# Patient Record
Sex: FEMALE | Race: WHITE | NOT HISPANIC OR LATINO | Employment: OTHER | URBAN - METROPOLITAN AREA
[De-identification: names, ages, dates, MRNs, and addresses within clinical notes are randomized per-mention and may not be internally consistent; named-entity substitution may affect disease eponyms.]

---

## 2021-11-02 RX ORDER — CYCLOBENZAPRINE HCL 10 MG
1 TABLET ORAL EVERY 24 HOURS
COMMUNITY

## 2021-11-02 RX ORDER — LEVOTHYROXINE SODIUM 0.15 MG/1
1 TABLET ORAL EVERY 24 HOURS
COMMUNITY

## 2021-11-03 ENCOUNTER — OFFICE VISIT (OUTPATIENT)
Dept: OBGYN CLINIC | Facility: CLINIC | Age: 71
End: 2021-11-03
Payer: MEDICARE

## 2021-11-03 VITALS
SYSTOLIC BLOOD PRESSURE: 102 MMHG | WEIGHT: 178.4 LBS | BODY MASS INDEX: 33.68 KG/M2 | DIASTOLIC BLOOD PRESSURE: 60 MMHG | HEIGHT: 61 IN

## 2021-11-03 DIAGNOSIS — Z87.410 HISTORY OF CERVICAL DYSPLASIA: ICD-10-CM

## 2021-11-03 DIAGNOSIS — Z12.31 BREAST CANCER SCREENING BY MAMMOGRAM: ICD-10-CM

## 2021-11-03 DIAGNOSIS — R63.4 UNINTENTIONAL WEIGHT LOSS: Primary | ICD-10-CM

## 2021-11-03 DIAGNOSIS — R14.0 BLOATING SYMPTOM: ICD-10-CM

## 2021-11-03 DIAGNOSIS — Z12.4 SCREENING FOR CERVICAL CANCER: ICD-10-CM

## 2021-11-03 DIAGNOSIS — Z78.0 POSTMENOPAUSAL ESTROGEN DEFICIENCY: ICD-10-CM

## 2021-11-03 PROBLEM — N87.9 CERVICAL DYSPLASIA: Status: ACTIVE | Noted: 2021-11-03

## 2021-11-03 PROCEDURE — G0476 HPV COMBO ASSAY CA SCREEN: HCPCS | Performed by: STUDENT IN AN ORGANIZED HEALTH CARE EDUCATION/TRAINING PROGRAM

## 2021-11-03 PROCEDURE — 99213 OFFICE O/P EST LOW 20 MIN: CPT | Performed by: STUDENT IN AN ORGANIZED HEALTH CARE EDUCATION/TRAINING PROGRAM

## 2021-11-03 PROCEDURE — G0101 CA SCREEN;PELVIC/BREAST EXAM: HCPCS | Performed by: STUDENT IN AN ORGANIZED HEALTH CARE EDUCATION/TRAINING PROGRAM

## 2021-11-03 PROCEDURE — G0145 SCR C/V CYTO,THINLAYER,RESCR: HCPCS | Performed by: STUDENT IN AN ORGANIZED HEALTH CARE EDUCATION/TRAINING PROGRAM

## 2021-11-03 RX ORDER — CALCITRIOL 0.5 UG/1
1 CAPSULE, LIQUID FILLED ORAL DAILY
COMMUNITY
End: 2022-01-25 | Stop reason: SDUPTHER

## 2021-11-03 RX ORDER — SPIRONOLACTONE 50 MG/1
1 TABLET, FILM COATED ORAL DAILY
COMMUNITY

## 2021-11-03 RX ORDER — ROSUVASTATIN CALCIUM 5 MG/1
1 TABLET, COATED ORAL EVERY 24 HOURS
COMMUNITY
Start: 2021-06-30

## 2021-11-03 RX ORDER — MECLIZINE HYDROCHLORIDE 25 MG/1
1 TABLET ORAL AS NEEDED
COMMUNITY
Start: 2021-09-09

## 2021-11-03 RX ORDER — LORAZEPAM 1 MG/1
1 TABLET ORAL AS NEEDED
COMMUNITY

## 2021-11-03 RX ORDER — LOSARTAN POTASSIUM 25 MG/1
1 TABLET ORAL EVERY 24 HOURS
COMMUNITY

## 2021-11-03 RX ORDER — FAMOTIDINE 40 MG/1
1 TABLET, FILM COATED ORAL AS NEEDED
COMMUNITY
Start: 2021-09-23

## 2021-11-03 RX ORDER — ERGOCALCIFEROL 1.25 MG/1
1 CAPSULE ORAL WEEKLY
COMMUNITY
Start: 2014-04-18

## 2021-11-03 RX ORDER — FLUOXETINE HYDROCHLORIDE 40 MG/1
2 CAPSULE ORAL EVERY 24 HOURS
COMMUNITY

## 2021-11-03 RX ORDER — OXYCODONE HYDROCHLORIDE 10 MG/1
1 TABLET ORAL AS NEEDED
COMMUNITY

## 2021-11-03 RX ORDER — FUROSEMIDE 80 MG
1 TABLET ORAL EVERY 24 HOURS
COMMUNITY
End: 2022-02-16

## 2021-11-03 RX ORDER — LAMOTRIGINE 150 MG/1
1 TABLET ORAL 2 TIMES DAILY
COMMUNITY

## 2021-11-05 LAB
HPV HR 12 DNA CVX QL NAA+PROBE: NEGATIVE
HPV16 DNA CVX QL NAA+PROBE: NEGATIVE
HPV18 DNA CVX QL NAA+PROBE: NEGATIVE

## 2021-11-08 LAB
LAB AP GYN PRIMARY INTERPRETATION: NORMAL
LAB AP LMP: NORMAL
Lab: NORMAL

## 2021-12-17 ENCOUNTER — TELEPHONE (OUTPATIENT)
Dept: OBGYN CLINIC | Facility: CLINIC | Age: 71
End: 2021-12-17

## 2021-12-17 PROBLEM — M81.6 LOCALIZED OSTEOPOROSIS WITHOUT CURRENT PATHOLOGICAL FRACTURE: Status: ACTIVE | Noted: 2021-12-17

## 2022-01-07 ENCOUNTER — CONSULT (OUTPATIENT)
Dept: OBGYN CLINIC | Facility: CLINIC | Age: 72
End: 2022-01-07
Payer: MEDICARE

## 2022-01-07 VITALS
DIASTOLIC BLOOD PRESSURE: 62 MMHG | WEIGHT: 174 LBS | HEIGHT: 61 IN | BODY MASS INDEX: 32.85 KG/M2 | SYSTOLIC BLOOD PRESSURE: 112 MMHG

## 2022-01-07 DIAGNOSIS — R93.89 THICKENED ENDOMETRIUM: ICD-10-CM

## 2022-01-07 DIAGNOSIS — M81.6 LOCALIZED OSTEOPOROSIS WITHOUT CURRENT PATHOLOGICAL FRACTURE: Primary | ICD-10-CM

## 2022-01-07 PROCEDURE — 99213 OFFICE O/P EST LOW 20 MIN: CPT | Performed by: STUDENT IN AN ORGANIZED HEALTH CARE EDUCATION/TRAINING PROGRAM

## 2022-01-07 NOTE — ASSESSMENT & PLAN NOTE
- Incidental finding of likely endometrial polyp on TVUS at [facility] yesterday  Normal ovaries  Patient has had no postmenopausal bleeding with exception of light spotting after performance of TVUS yesterday  - Reviewed that if polyp present, resection is typically recommended due to risk of malignant transformation  Performance of sonohysterography would be prudent to confirm presence of polyp prior to proceeding with resection  Will schedule

## 2022-01-07 NOTE — ASSESSMENT & PLAN NOTE
- DEXA results reviewed  Discussed diagnosis of osteoporosis  Treatment is recommended to reduce risk of fracture  - Recommend first-line treatment with oral alendronate  No hx GERD  Risks/benefits reviewed  - Will obtain baseline creatinine, serum calcium, and vitamin D  Pt believes she had all of these done with PCP in October 2021  Will obtain lab results and bring them to office for review  In case these were not completed, orders provided today for BMP and vitamin D level  Will provide Rx Fosamax upon review of satisfactory results  - Plan for repeat DEXA 2 years after initiation of therapy

## 2022-01-07 NOTE — PROGRESS NOTES
[address] [facility], [address], [facility], [address] [facility]    Assessment/Plan:  1  Localized osteoporosis without current pathological fracture  Assessment & Plan:  - DEXA results reviewed  Discussed diagnosis of osteoporosis  Treatment is recommended to reduce risk of fracture  - Recommend first-line treatment with oral alendronate  No hx GERD  Risks/benefits reviewed  - Will obtain baseline creatinine, serum calcium, and vitamin D  Pt believes she had all of these done with PCP in October 2021  Will obtain lab results and bring them to office for review  In case these were not completed, orders provided today for BMP and vitamin D level  Will provide Rx Fosamax upon review of satisfactory results  - Plan for repeat DEXA 2 years after initiation of therapy  Orders:  -     Basic metabolic panel; Future  -     Vitamin D 1,25 dihydroxy; Future    2  Thickened endometrium  Assessment & Plan:  - Incidental finding of likely endometrial polyp on TVUS at [facility] yesterday  Normal ovaries  Patient has had no postmenopausal bleeding with exception of light spotting after performance of TVUS yesterday  - Reviewed that if polyp present, resection is typically recommended due to risk of malignant transformation  Performance of sonohysterography would be prudent to confirm presence of polyp prior to proceeding with resection  Will schedule  Orders:  -     US sonohysterogram w or wo doppler; Future; Expected date: 01/07/2022      Subjective:   Emily Setting is a 73 y o  [id number]   CC:   Chief Complaint   Patient presents with    Consult     Pt here to discuss dexa scan        HPI: Patient presents for review of imaging results  She is without complaint today  ROS: Negative except as noted in HPI    No LMP recorded  Patient is postmenopausal        She  reports being sexually active and has had partner(s) who are male   She reports using the following method of birth control/protection: Female Sterilization         The following portions of the patient's history were reviewed and updated as appropriate:   Past Medical History:   Diagnosis Date    Anxiety     Condyloma     Depression     Eczema     HPV in female     [facility] [address]     Hypertension     Melanoma ([facility] [address] [facility]     Migraines     Papanicolaou smear 04/13/2018    Thyroid cancer ([facility] [address] [facility]      Past Surgical History:   Procedure Laterality Date    ABDOMINOPLASTY  1985    BARIATRIC SURGERY  2003    CERVICAL CONE BIOPSY      [address] [place] [address]    COLONOSCOPY  2014    DILATION AND EVACUATION  1986    DXA PROCEDURE (HISTORICAL)  2017    HERNIA REPAIR      MAMMO (HISTORICAL)  2017    [facility]    SINUS SURGERY      THYROIDECTOMY, PARTIAL      TONSILLECTOMY      TUBAL LIGATION       Family History   Problem Relation Age of Onset    Diabetes Mother     Hypertension Mother     Hypertension Father     Diabetes Father     Diabetes Maternal Grandmother     Heart disease Maternal Grandfather     No Known Problems Paternal Grandmother     No Known Problems Paternal Grandfather     Asthma Son     Raynaud syndrome Son     Breast cancer Neg Hx     Ovarian cancer Neg Hx     Colon cancer Neg Hx     Uterine cancer Neg Hx      Social History     Socioeconomic History    Marital status: /Civil Union     Spouse name: None    Number of children: None    Years of education: None    Highest education level: None   Occupational History    Occupation: Retired    Tobacco Use    Smoking status: Never Smoker    Smokeless tobacco: Never Used   Vaping Use    Vaping Use: Never used   Substance and Sexual Activity    Alcohol use: Never     Comment: Does not drink alcohol - As per [employer]     Drug use: Never     Comment: No - As per [employer]     Sexual activity: Yes     Partners: Male     Birth control/protection: Female Sterilization     Comment: no new partner in 28 years   Other Topics Concern    None   Social History Narrative    Exercise: Occasionally    Domestic violence: No     Social Determinants of Health     Financial Resource Strain: Not on file   Food Insecurity: Not on file   Transportation Needs: Not on file   Physical Activity: Not on file   Stress: Not on file   Social Connections: Not on file   Intimate Partner Violence: Not on file   Housing Stability: Not on file     Outpatient Medications Marked as Taking for the 1/7/22 encounter (Consult) with Emma Verde MD   Medication    albuterol (PROVENTIL HFA,VENTOLIN HFA) 90 mcg/act inhaler    calcitriol (ROCALTROL) 0 5 MCG capsule    cyclobenzaprine (FLEXERIL) 10 mg tablet    ergocalciferol (VITAMIN D2) 50,000 units    famotidine (PEPCID) 40 MG tablet    FLUoxetine (PROzac) 40 MG capsule    furosemide (LASIX) 80 mg tablet    lamoTRIgine (LaMICtal) 150 MG tablet    levothyroxine 150 mcg tablet    LORazepam (ATIVAN) 1 mg tablet    losartan (COZAAR) 25 mg tablet    meclizine (ANTIVERT) 25 mg tablet    metoprolol tartrate (LOPRESSOR) 25 mg tablet    Multiple Vitamins-Minerals (CENTRUM SILVER 50+WOMEN PO)    oxyCODONE (ROXICODONE) 10 MG TABS    rosuvastatin (CRESTOR) 5 mg tablet    spironolactone (ALDACTONE) 50 mg tablet     Allergies   Allergen Reactions    Banana Extract Allergy Skin Test - Food Allergy Shortness Of Breath     Chest tightness    Charentais Melon (Saudi Arabian Melon) Shortness Of Breath     Cantelope  Chest tightness    Kiwi Extract - Food Allergy Shortness Of Breath     Chest tightness    Aspirin Other (See Comments)    Ibuprofen Hives, Itching and Swelling    Iodinated Diagnostic Agents Hives    Naproxen Hives    Nsaids Other (See Comments)     swollen lips, oral itching    Sulfamethoxazole-Trimethoprim Blisters and Other (See Comments)     oral ulcers      Ciprofloxacin Rash    Latex Rash and Swelling         Imaging: _____________________________________________            Objective:  /62 (BP Location: Left arm, Patient Position: Sitting, Cuff Size: Standard)   Ht 5' 1" (1 549 m)   Wt 78 9 kg (174 lb)   BMI 32 88 kg/m²      General Appearance: alert and oriented, in no acute distress  Extremities: Normal range of motion     Skin: normal, no rash or abnormalities  Neurologic: alert, oriented x3  Psychiatric: Appropriate affect, mood stable, cooperative with exam         Amauri Torres MD  1/7/2022 2:07 PM

## 2022-01-12 ENCOUNTER — TELEPHONE (OUTPATIENT)
Dept: DERMATOLOGY | Facility: CLINIC | Age: 72
End: 2022-01-12

## 2022-01-21 ENCOUNTER — TELEPHONE (OUTPATIENT)
Dept: OBGYN CLINIC | Facility: CLINIC | Age: 72
End: 2022-01-21

## 2022-01-21 NOTE — TELEPHONE ENCOUNTER
Please have patient do one of the followin  Stop by the office to complete release of records form to obtain recent Vitamin D and Calcium levels  She can do this at the time of her upcoming sonohyst on  or sooner if she would like  2  Have new blood work drawn  Orders were provided at the time of her most recent visit  Rx for fosamax will be provided once labs have been reviewed       Mike Foote MD  2022 12:06 PM

## 2022-01-21 NOTE — TELEPHONE ENCOUNTER
Spoke with pt and provided following options  Dr Pollo Evans will prescribe Fosamax once labs have been reviewed  Pt informed her PCP has a copy of her blood work, she will reach out to PCP and inquire if they can fax a copy for Dr Pollo Evans  Fax and MR number provided to help facilitate copy of blood work

## 2022-01-21 NOTE — TELEPHONE ENCOUNTER
Carolina Albarran left a message that she had blood tests completed and asked [facility] to forward a copy of the results to you  The patient states she was told by [facility] that because you were not the ordering doctor they will not send you results unless you request them

## 2022-01-24 ENCOUNTER — TELEPHONE (OUTPATIENT)
Dept: OBGYN CLINIC | Facility: CLINIC | Age: 72
End: 2022-01-24

## 2022-01-24 DIAGNOSIS — M81.6 LOCALIZED OSTEOPOROSIS WITHOUT CURRENT PATHOLOGICAL FRACTURE: Primary | ICD-10-CM

## 2022-01-24 NOTE — TELEPHONE ENCOUNTER
Called patient to discuss lab results sent from Dr Maria Luisa Shanks office (see Media tab)  Calcium was low normal (pt not taking calcium supplementation at time of labs) and renal function was normal  No results received for vitamin D level  Given her history of hyperparathyroidism, important to confirm normal vitamin D level prior to initiation of bisphosphonate therapy  Patient will call Dr Maria Luisa Shanks office again, as she believes vitamin D level was drawn  Will await results  If normal vitamin D level, will prescribe calcitriol (pt states she needs rx) and Fosamax, as previously discussed       Diana Weems MD  1/24/2022 12:00 PM

## 2022-01-25 RX ORDER — CALCITRIOL 0.5 UG/1
0.5 CAPSULE, LIQUID FILLED ORAL DAILY
Qty: 30 CAPSULE | Refills: 11 | Status: SHIPPED | OUTPATIENT
Start: 2022-01-25 | End: 2023-01-25

## 2022-01-25 RX ORDER — ALENDRONATE SODIUM 70 MG/1
70 TABLET ORAL
Qty: 4 TABLET | Refills: 12 | Status: SHIPPED | OUTPATIENT
Start: 2022-01-25 | End: 2022-02-16

## 2022-01-25 NOTE — TELEPHONE ENCOUNTER
All necessary lab results now received  Creatinine, vitamin D level, and calcium level appropriate  Will initiate calcitriol and Fosamax, as previously discussed  Rx sent  Patient informed by phone      Keeley Zee MD  1/25/2022 1:01 PM

## 2022-02-01 ENCOUNTER — OFFICE VISIT (OUTPATIENT)
Dept: GASTROENTEROLOGY | Facility: CLINIC | Age: 72
End: 2022-02-01
Payer: MEDICARE

## 2022-02-01 VITALS
WEIGHT: 174 LBS | DIASTOLIC BLOOD PRESSURE: 72 MMHG | SYSTOLIC BLOOD PRESSURE: 114 MMHG | BODY MASS INDEX: 32.85 KG/M2 | HEART RATE: 68 BPM | HEIGHT: 61 IN

## 2022-02-01 DIAGNOSIS — Z98.890 HISTORY OF COLONOSCOPY: ICD-10-CM

## 2022-02-01 DIAGNOSIS — K21.9 GASTROESOPHAGEAL REFLUX DISEASE, UNSPECIFIED WHETHER ESOPHAGITIS PRESENT: ICD-10-CM

## 2022-02-01 DIAGNOSIS — R63.4 UNINTENTIONAL WEIGHT LOSS: Primary | ICD-10-CM

## 2022-02-01 DIAGNOSIS — D64.9 ANEMIA, UNSPECIFIED TYPE: ICD-10-CM

## 2022-02-01 PROCEDURE — 99213 OFFICE O/P EST LOW 20 MIN: CPT | Performed by: NURSE PRACTITIONER

## 2022-02-01 NOTE — PATIENT INSTRUCTIONS
Scheduled date of colonoscopy (as of today): not scheduled  Physician performing colonoscopy:  Location of colonoscopy: [facility]  Bowel prep reviewed with patient: Clenpiq  Instructions reviewed with patient by: Tori Live  Clearances: none

## 2022-02-02 NOTE — PROGRESS NOTES
[address] [facility] - Outpatient Follow-up Note  Emmie Fair 73 y o  female MRN: [id number]  Encounter: [id number]    ASSESSMENT AND PLAN:      1  Unintentional weight loss  Patient states she has lost approximately 64 lb over the last 6 months  She denies difficulty eating or swallowing and states she eats a normal diet  She does have a history of gastric bypass Jose-en-Y 2003 states she has not had any complications over the years  Most recent lab work was normal   She denies any overt bleeding  Pending EGD and colon will consider nutrition consult, possibly with bariatric staff  Consider malabsorption, malignancy  - schedule EGD and colonoscopy at [address] [place] [address] per patients request   Due to patient having bradycardic and tachycardic heart rates ( bpm) noted to loop recorder will schedule at hospital setting  2  Gastroesophageal reflux disease  Patient states she rarely has acid reflux symptoms  If alarm symptoms do occur, she states that she takes Pepcid 40 mg as needed to resolve symptoms  3  Anemia, unspecified type  Patient states that she has had a history of anemia however most recent lab work was normal   She denies any overt bleeding  Will continue to monitor lab work  She will be having an EGD/colonoscopy subsequently for unintentional weight loss  4 History of colonoscopy  Colonoscopy 2015; 10 year recall      Followup Appointment:  3-4 months, after EGD/colonoscopy  ______________________________________________________________________    Chief Complaint   Patient presents with   [facility]  loss     Referred by Dr Nataliia Esparza     HPI:  61-year-old female with history of GERD, anemia, IBS, cholecystectomy, gastric bypass Jose-en-Y (2003) presents for consult by PCP with weight loss, GERD, anemia  Patient states that she has lost approximately 64 lb in 6 months  Patient states she is eating a normal diet without complications    She denies nausea or vomiting  She states she does have a deep left-sided discomfort she believes to be possible hernia as discussed with her PCP  She denies any acid reflux symptoms and she has taken Pepcid 40 mg as needed for alarm symptoms  She states she is having daily small formed bowel movements  She denies hematochezia or melena  Most recent lab work CBC, CMP normal with exception of alk-phos of 206  Iron panel, B12, folate, TSH normal   Patient's last colonoscopy 2015 noted mild diverticulosis, hemorrhoids, 10 year recall  EGD 2015 noted small hiatal hernia otherwise normal   Patient does state that she has a loop recorder that has identified Lexy Gonzalez and tachy heart rates  Nonsmoker, denies ETOH use        Historical Information   Past Medical History:   Diagnosis Date    Anxiety     Condyloma     Depression     Eczema     HPV in female     [facility] [address]     Hypertension     Melanoma ([facility] [address]     Migraines     Papanicolaou smear 2018    Thyroid cancer ([facility] [address]      Past Surgical History:   Procedure Laterality Date    ABDOMINOPLASTY      BARIATRIC SURGERY      CERVICAL CONE BIOPSY       SECTION   &     COLONOSCOPY      DILATION AND EVACUATION      DXA PROCEDURE (HISTORICAL)      HERNIA REPAIR      MAMMO (HISTORICAL)      [facility]    SINUS SURGERY      THYROIDECTOMY, PARTIAL      TONSILLECTOMY      TUBAL LIGATION       Social History     Substance and Sexual Activity   Alcohol Use Never    Comment: Does not drink alcohol - As per [employer]      Social History     Substance and Sexual Activity   Drug Use Never    Comment: No - As per [employer]      Social History     Tobacco Use   Smoking Status Never Smoker   Smokeless Tobacco Never Used     Family History   Problem Relation Age of Onset    Diabetes Mother     Hypertension Mother     Hypertension Father     Diabetes Father     Diabetes Maternal Grandmother     Heart disease Maternal Grandfather     No Known Problems Paternal Grandmother     No Known Problems Paternal Grandfather     Asthma Son     Raynaud syndrome Son     Breast cancer Neg Hx     Ovarian cancer Neg Hx     Colon cancer Neg Hx     Uterine cancer Neg Hx          Current Outpatient Medications:     albuterol (PROVENTIL HFA,VENTOLIN HFA) 90 mcg/act inhaler    alendronate (FOSAMAX) 70 mg tablet    calcitriol (ROCALTROL) 0 5 MCG capsule    cyclobenzaprine (FLEXERIL) 10 mg tablet    ergocalciferol (VITAMIN D2) 50,000 units    famotidine (PEPCID) 40 MG tablet    FLUoxetine (PROzac) 40 MG capsule    furosemide (LASIX) 80 mg tablet    lamoTRIgine (LaMICtal) 150 MG tablet    levothyroxine 150 mcg tablet    LORazepam (ATIVAN) 1 mg tablet    losartan (COZAAR) 25 mg tablet    meclizine (ANTIVERT) 25 mg tablet    metoprolol tartrate (LOPRESSOR) 25 mg tablet    Multiple Vitamins-Minerals (CENTRUM SILVER 50+WOMEN PO)    oxyCODONE (ROXICODONE) 10 MG TABS    rosuvastatin (CRESTOR) 5 mg tablet    spironolactone (ALDACTONE) 50 mg tablet  Allergies   Allergen Reactions    Banana Extract Allergy Skin Test - Food Allergy Shortness Of Breath     Chest tightness    Charentais Melon (Belarusian Melon) Shortness Of Breath     Cantelope  Chest tightness    Kiwi Extract - Food Allergy Shortness Of Breath     Chest tightness    Aspirin Other (See Comments)    Ibuprofen Hives, Itching and Swelling    Iodinated Diagnostic Agents Hives    Naproxen Hives    Nsaids Other (See Comments)     swollen lips, oral itching    Sulfamethoxazole-Trimethoprim Blisters and Other (See Comments)     oral ulcers      Ciprofloxacin Rash    Latex Rash and Swelling     Reviewed medications and allergies and updated as indicated    PHYSICAL EXAM:    Blood pressure 114/72, pulse 68, height 5' 1" (1 549 m), weight 78 9 kg (174 lb), not currently breastfeeding  Body mass index is 32 88 kg/m²    General Appearance: NAD, cooperative, alert  Eyes: Anicteric, PERRLA, EOMI  ENT:  Normocephalic, atraumatic, normal mucosa  Neck:  Supple, symmetrical, trachea midline  Resp:  Clear to auscultation bilaterally; no rales, rhonchi or wheezing; respirations unlabored   CV:  S1 S2, Regular rate and rhythm; no murmur, rub, or gallop  GI:  Soft, upper left abdomen discomfort with palpation, non-distended; normal bowel sounds; no masses, no organomegaly   Rectal: Deferred  Musculoskeletal: No cyanosis, clubbing or edema  Normal ROM  Skin:  No jaundice, rashes, or lesions   Heme/Lymph: No palpable cervical lymphadenopathy  Psych: Normal affect, good eye contact  Neuro: No gross deficits, AAOx3    Lab Results:   No results found for: WBC, HGB, HCT, MCV, PLT  No results found for: NA, K, CL, CO2, ANIONGAP, BUN, CREATININE, GLUCOSE, GLUF, CALCIUM, CORRECTEDCA, AST, ALT, ALKPHOS, PROT, BILITOT, EGFR  No results found for: IRON, TIBC, FERRITIN  No results found for: LIPASE    Radiology Results:   No results found

## 2022-02-09 ENCOUNTER — ULTRASOUND (OUTPATIENT)
Dept: OBGYN CLINIC | Facility: CLINIC | Age: 72
End: 2022-02-09

## 2022-02-09 ENCOUNTER — ULTRASOUND (OUTPATIENT)
Dept: OBGYN CLINIC | Facility: CLINIC | Age: 72
End: 2022-02-09
Payer: MEDICARE

## 2022-02-09 DIAGNOSIS — N88.2 CERVICAL STENOSIS (UTERINE CERVIX): ICD-10-CM

## 2022-02-09 DIAGNOSIS — N84.0 ENDOMETRIAL POLYP: ICD-10-CM

## 2022-02-09 DIAGNOSIS — R93.89 THICKENED ENDOMETRIUM: ICD-10-CM

## 2022-02-09 DIAGNOSIS — R93.89 THICKENED ENDOMETRIUM: Primary | ICD-10-CM

## 2022-02-09 PROCEDURE — 76830 TRANSVAGINAL US NON-OB: CPT | Performed by: STUDENT IN AN ORGANIZED HEALTH CARE EDUCATION/TRAINING PROGRAM

## 2022-02-09 PROCEDURE — 76831 ECHO EXAM UTERUS: CPT | Performed by: STUDENT IN AN ORGANIZED HEALTH CARE EDUCATION/TRAINING PROGRAM

## 2022-02-09 PROCEDURE — 58340 CATHETER FOR HYSTEROGRAPHY: CPT | Performed by: STUDENT IN AN ORGANIZED HEALTH CARE EDUCATION/TRAINING PROGRAM

## 2022-02-09 NOTE — PROGRESS NOTES
Sonohysterogram    Date/Time: 2/9/2022 1:54 PM  Performed by: Tito Novak MD  Authorized by: Tito Novak MD   Universal Protocol:  Consent: Verbal consent obtained  Risks and benefits: risks, benefits and alternatives were discussed  Consent given by: patient  Patient understanding: patient states understanding of the procedure being performed  Patient consent: the patient's understanding of the procedure matches consent given  Required items: required blood products, implants, devices, and special equipment available  Patient identity confirmed: verbally with patient      Pre-procedure:     Prepped with: Betadine    Procedure:     Cervix cleaned and prepped: yes      Cervix dilated: yes (Dilation attempted, but unsuccessful due to stenotic external os)      Tenaculum applied to cervix: yes      Uterus sounded: no      Catheter inserted: no (Unable to cannulate cervical canal)    Comments:      Procedure aborted due to cervical stenosis

## 2022-02-09 NOTE — PROGRESS NOTES
AMB US Pelvic Non OB    Date/Time: 2/9/2022 2:05 PM  Performed by: Christiano Garzon  Authorized by: Tito Novak MD     Procedure details:     Indications: polyp of corpus uteri    Uterine findings:     Length (cm): 5 8    Height (cm):  2 3    Width (cm):  4 6    Uterine adhesions: not identified      Adnexal mass: not identified      Polyps: identified      Myomas: not identified      Endometrial stripe: identified      Endometrial hyperplasia: not identified      Endometrium thickness (mm):  5  Left ovary findings:     Left ovary:  Visualized    Cysts: not identified      Length (cm): 2 5    Height (cm): 1 9    Width (cm): 2  Right ovary findings:     Right ovary:  Visualized    Cysts: not identified      Length (cm): 3    Height (cm): 2 5    Width (cm): 2  Other findings:     Free pelvic fluid: not identified      Free peritoneal fluid: not identified    Post-Procedure Details:     Impression:  Endo-5mm  SIS-attempted not completed-Endo Polyp=13 x 12 x 11 mm    Tolerance:   Tolerated well, no immediate complications

## 2022-02-11 ENCOUNTER — TELEPHONE (OUTPATIENT)
Dept: OBGYN CLINIC | Facility: CLINIC | Age: 72
End: 2022-02-11

## 2022-02-11 NOTE — TELEPHONE ENCOUNTER
Called patient to review results of pelvic ultrasound, which showed endometrial polyp measuring 12 x 11 x 10 mm with surrounding intracavitary fluid  Given finding, recommend excision  Will schedule for pre-op visit for hysteroscopy, polypectomy, and D&C       Jackelin Hwang MD  2/11/2022 12:13 PM

## 2022-02-16 ENCOUNTER — CONSULT (OUTPATIENT)
Dept: OBGYN CLINIC | Facility: CLINIC | Age: 72
End: 2022-02-16
Payer: MEDICARE

## 2022-02-16 ENCOUNTER — TELEPHONE (OUTPATIENT)
Dept: OBGYN CLINIC | Facility: CLINIC | Age: 72
End: 2022-02-16

## 2022-02-16 VITALS
BODY MASS INDEX: 32.17 KG/M2 | WEIGHT: 170.4 LBS | HEIGHT: 61 IN | DIASTOLIC BLOOD PRESSURE: 60 MMHG | SYSTOLIC BLOOD PRESSURE: 122 MMHG

## 2022-02-16 DIAGNOSIS — M81.6 LOCALIZED OSTEOPOROSIS WITHOUT CURRENT PATHOLOGICAL FRACTURE: Primary | ICD-10-CM

## 2022-02-16 DIAGNOSIS — N88.2 CERVICAL STENOSIS (UTERINE CERVIX): Primary | ICD-10-CM

## 2022-02-16 DIAGNOSIS — M81.6 LOCALIZED OSTEOPOROSIS WITHOUT CURRENT PATHOLOGICAL FRACTURE: ICD-10-CM

## 2022-02-16 DIAGNOSIS — N84.0 ENDOMETRIAL POLYP: ICD-10-CM

## 2022-02-16 PROCEDURE — 99214 OFFICE O/P EST MOD 30 MIN: CPT | Performed by: STUDENT IN AN ORGANIZED HEALTH CARE EDUCATION/TRAINING PROGRAM

## 2022-02-16 RX ORDER — FUROSEMIDE 40 MG/1
2 TABLET ORAL DAILY
COMMUNITY
Start: 2021-12-07

## 2022-02-16 RX ORDER — DIPHENHYDRAMINE HCL 25 MG
CAPSULE ORAL AS NEEDED
COMMUNITY
Start: 2021-08-19

## 2022-02-16 RX ORDER — B-COMPLEX WITH VITAMIN C
TABLET ORAL EVERY 24 HOURS
COMMUNITY

## 2022-02-16 RX ORDER — MISOPROSTOL 100 UG/1
TABLET ORAL
Qty: 2 TABLET | Refills: 0 | Status: SHIPPED | OUTPATIENT
Start: 2022-02-16

## 2022-02-16 NOTE — ASSESSMENT & PLAN NOTE
- Recently started Fosamax  Following discussion with patient, will switch to Reclast due to concern for absorption following gastric surgery

## 2022-02-16 NOTE — PROGRESS NOTES
Pre-Operative History & Physical  [facility] - [address], [address], [facility] [address]    Assessment/Plan:  Vickie Hermosillo is a 73 y o  [id number] with endometrial polyp desiring definitive surgical management  - Options for management of endometrial polyp were reviewed with the patient at length  Given relatively large size of polyp with surrounding fluid in this post-menopausal patient, recommend polypectomy to evaluate for hyperplasia/malignancy  - Plan for hysteroscopy with polypectomy and dilation & curettage (Symphion)  Surgical consents reviewed and signed with patient today  Risks of surgery were reviewed, including bleeding, infection, damage to surrounding organs/structures (specifically bowel, bladder, vessels, nerves, ureters), scar tissue formation, uterine perforation, failure to successfully remove polyp, and need for further surgery  We specifically reviewed that her known cervical stenosis does increase risk of uterine perforation as well as risk of failed procedure  All questions answered to the patient's apparent satisfaction  Patient agrees to proceed with surgery as discussed  - Given cervical stenosis, will pre-treat with misoprostol 100 mcg per vagina on the evening prior to surgery with second dose on the morning of surgery  - Will need pre-op risk stratification with PCP and Cardiology  - PATs: CBC, T&S, HgbA1c, EKG  - Antibiotic prophylaxis: None indicated  - VTE ppx: SCDs  1  Cervical stenosis (uterine cervix)  -     misoprostol (CYTOTEC) 100 mcg tablet; Administer 1 tablet vaginally on the evening prior to surgery and a second tablet vaginally on the morning of surgery  2  Endometrial polyp    3  Localized osteoporosis without current pathological fracture  Assessment & Plan:  - Recently started Fosamax  Following discussion with patient, will switch to Reclast due to concern for absorption following gastric surgery  ____________________________________    Subjective:   Pam Blancas is a 73 y o  [id number]   CC:   Chief Complaint   Patient presents with    Pre-op Exam     HPI: Patient presents to review results of pelvic ultrasound and to discuss further management of suspected endometrial polyp  Separately, she wishes to discuss possibility of switching from oral Fosamax to IV Reclast due to concern for absorption following prior bariatric surgery      ROS: Negative except as noted in HPI    The following portions of the patient's history were reviewed and updated as appropriate:   Past Medical History:   Diagnosis Date    Anxiety     Condyloma     Depression     Eczema     HPV in female     [facility] [address] [facility]     Hypertension     Melanoma ([facility] [address] [facility]     Migraines     Papanicolaou smear 04/13/2018    Thyroid cancer ([facility] [address] [facility]      Past Surgical History:   Procedure Laterality Date    ABDOMINOPLASTY  1985    BARIATRIC SURGERY  2003    CERVICAL CONE BIOPSY      [address]  2014    DILATION AND EVACUATION  1986    DXA PROCEDURE (HISTORICAL)  2017    HERNIA REPAIR      [facility]ICAL)  2017    [facility]    SINUS SURGERY      THYROIDECTOMY, PARTIAL      TONSILLECTOMY      TUBAL LIGATION       Family History   Problem Relation Age of Onset    Diabetes Mother     Hypertension Mother     Hypertension Father     Diabetes Father     Diabetes Maternal Grandmother     Heart disease Maternal Grandfather     No Known Problems Paternal Grandmother     No Known Problems Paternal Grandfather     Asthma Son     Raynaud syndrome Son     Breast cancer Neg Hx     Ovarian cancer Neg Hx     Colon cancer Neg Hx     Uterine cancer Neg Hx      Social History     Socioeconomic History    Marital status: /Civil Union     Spouse name: None    Number of children: None    Years of education: None    Highest education level: None   Occupational History    Occupation: Retired    Tobacco Use    Smoking status: Never Smoker    Smokeless tobacco: Never Used   Vaping Use    Vaping Use: Never used   Substance and Sexual Activity    Alcohol use: Never     Comment: Does not drink alcohol - As per [employer]     Drug use: Never     Comment: No - As per [employer]     Sexual activity: Yes     Partners: Male     Birth control/protection: Female Sterilization     Comment: no new partner in 28 years   Other Topics Concern    None   Social History Narrative    Exercise: Occasionally    Domestic violence: No     Social Determinants of Health     Financial Resource Strain: Not on file   Food Insecurity: Not on file   Transportation Needs: Not on file   Physical Activity: Not on file   Stress: Not on file   Social Connections: Not on file   Intimate Partner Violence: Not on file   Housing Stability: Not on file     Outpatient Medications Marked as Taking for the 2/16/22 encounter (Consult) with Carlos Martin MD   Medication    albuterol (PROVENTIL HFA,VENTOLIN HFA) 90 mcg/act inhaler    calcitriol (ROCALTROL) 0 5 MCG capsule    cyclobenzaprine (FLEXERIL) 10 mg tablet    diphenhydrAMINE (BENADRYL) 25 mg capsule    ergocalciferol (VITAMIN D2) 50,000 units    famotidine (PEPCID) 40 MG tablet    FLUoxetine (PROzac) 40 MG capsule    furosemide (LASIX) 40 mg tablet    lamoTRIgine (LaMICtal) 150 MG tablet    levothyroxine 150 mcg tablet    LORazepam (ATIVAN) 1 mg tablet    losartan (COZAAR) 25 mg tablet    meclizine (ANTIVERT) 25 mg tablet    metoprolol tartrate (LOPRESSOR) 25 mg tablet    Multiple Vitamins-Minerals (CENTRUM SILVER 50+WOMEN PO)    oxyCODONE (ROXICODONE) 10 MG TABS    rosuvastatin (CRESTOR) 5 mg tablet    spironolactone (ALDACTONE) 50 mg tablet    [DISCONTINUED] alendronate (FOSAMAX) 70 mg tablet     Allergies   Allergen Reactions    Banana Extract Allergy Skin Test - Food Allergy Shortness Of Breath     Chest tightness    Charentais Melon [place] Melon) Shortness Of Breath     Cantelope  Chest tightness    Kiwi Extract - Food Allergy Shortness Of Breath     Chest tightness    Aspirin Other (See Comments)    Ibuprofen Hives, Itching and Swelling    Iodinated Diagnostic Agents Hives    Naproxen Hives    Nsaids Other (See Comments)     swollen lips, oral itching    Pneumococcal Vaccine Other (See Comments)    Sulfamethoxazole-Trimethoprim Blisters and Other (See Comments)     oral ulcers      Trimethoprim Other (See Comments)    Ciprofloxacin Rash    Latex Rash and Swelling         Imaging: (2/9/2022)  On transvaginal imagine, the uterus measures 5 8 x 2 3 x 4 6 cm  The endometrium measures 5 mm in thickness  Within the uterine cavity, there is a 13 x 12 x 11 mm polyp with surrounding intracavitary fluid  This is consistent with likely endometrial polyp  The left ovary measures 2 5 x 1 9 x 2 0 cm and appears normal  The right ovary measures 3 0 x 2 5 x 2 0 cm and appears normal       Impression: Endometrial polyp measuring 13 x 12 x 11 mm  Otherwise normal uterus and ovaries  Objective:  /60 (BP Location: Left arm, Patient Position: Sitting, Cuff Size: Large)   Ht 5' 0 75" (1 543 m)   Wt 77 3 kg (170 lb 6 4 oz)   Breastfeeding No   BMI 32 46 kg/m²        General Appearance: alert and oriented, in no acute distress  HEENT: NC/AT, EOMI  CVS: Regular rate and rhythm  Lungs: Normal work of breathing  Abdomen: Soft, non-tender, non-distended, no masses, no rebound or guarding  Extremities: Normal range of motion     Skin: normal, no rash or abnormalities  Neurologic: alert, oriented x3  Psychiatric: Appropriate affect, mood stable, cooperative with exam         Jareth Nur MD  2/16/2022 5:32 PM

## 2022-02-16 NOTE — H&P (VIEW-ONLY)
Pre-Operative History & Physical  [facility] - [address], [address], [facility] [address]    Assessment/Plan:  Anastasiya Nava is a 73 y o  [id number] with endometrial polyp desiring definitive surgical management  - Options for management of endometrial polyp were reviewed with the patient at length  Given relatively large size of polyp with surrounding fluid in this post-menopausal patient, recommend polypectomy to evaluate for hyperplasia/malignancy  - Plan for hysteroscopy with polypectomy and dilation & curettage (Symphion)  Surgical consents reviewed and signed with patient today  Risks of surgery were reviewed, including bleeding, infection, damage to surrounding organs/structures (specifically bowel, bladder, vessels, nerves, ureters), scar tissue formation, uterine perforation, failure to successfully remove polyp, and need for further surgery  We specifically reviewed that her known cervical stenosis does increase risk of uterine perforation as well as risk of failed procedure  All questions answered to the patient's apparent satisfaction  Patient agrees to proceed with surgery as discussed  - Given cervical stenosis, will pre-treat with misoprostol 100 mcg per vagina on the evening prior to surgery with second dose on the morning of surgery  - Will need pre-op risk stratification with PCP and Cardiology  - PATs: CBC, T&S, HgbA1c, EKG  - Antibiotic prophylaxis: None indicated  - VTE ppx: SCDs  1  Cervical stenosis (uterine cervix)  -     misoprostol (CYTOTEC) 100 mcg tablet; Administer 1 tablet vaginally on the evening prior to surgery and a second tablet vaginally on the morning of surgery  2  Endometrial polyp    3  Localized osteoporosis without current pathological fracture  Assessment & Plan:  - Recently started Fosamax  Following discussion with patient, will switch to Reclast due to concern for absorption following gastric surgery  ____________________________________    Subjective:   Shon Sanon is a 73 y o  [id number]   CC:   Chief Complaint   Patient presents with    Pre-op Exam     HPI: Patient presents to review results of pelvic ultrasound and to discuss further management of suspected endometrial polyp  Separately, she wishes to discuss possibility of switching from oral Fosamax to IV Reclast due to concern for absorption following prior bariatric surgery      ROS: Negative except as noted in HPI    The following portions of the patient's history were reviewed and updated as appropriate:   Past Medical History:   Diagnosis Date    Anxiety     Condyloma     Depression     Eczema     HPV in female     [facility] [address]     Hypertension     Melanoma ([facility] [address] [facility]     Migraines     Papanicolaou smear 04/13/2018    Thyroid cancer ([facility] [address] )      Past Surgical History:   Procedure Laterality Date    ABDOMINOPLASTY  1985    BARIATRIC SURGERY  2003    CERVICAL CONE BIOPSY      [address]  2014    DILATION AND EVACUATION  1986    DXA PROCEDURE (HISTORICAL)  2017    HERNIA REPAIR      [facility]    SINUS SURGERY      THYROIDECTOMY, PARTIAL      TONSILLECTOMY      TUBAL LIGATION       Family History   Problem Relation Age of Onset    Diabetes Mother     Hypertension Mother     Hypertension Father     Diabetes Father     Diabetes Maternal Grandmother     Heart disease Maternal Grandfather     No Known Problems Paternal Grandmother     No Known Problems Paternal Grandfather     Asthma Son     Raynaud syndrome Son     Breast cancer Neg Hx     Ovarian cancer Neg Hx     Colon cancer Neg Hx     Uterine cancer Neg Hx      Social History     Socioeconomic History    Marital status: /Civil Union     Spouse name: None    Number of children: None    Years of education: None    Highest education level: None   Occupational History    Occupation: Retired    Tobacco Use    Smoking status: Never Smoker    Smokeless tobacco: Never Used   Vaping Use    Vaping Use: Never used   Substance and Sexual Activity    Alcohol use: Never     Comment: Does not drink alcohol - As per eClinicalWorks     Drug use: Never     Comment: No - As per eClinicalWorks     Sexual activity: Yes     Partners: Male     Birth control/protection: Female Sterilization     Comment: no new partner in 28 years   Other Topics Concern    None   Social History Narrative    Exercise: Occasionally    Domestic violence: No     Social Determinants of Health     Financial Resource Strain: Not on file   Food Insecurity: Not on file   Transportation Needs: Not on file   Physical Activity: Not on file   Stress: Not on file   Social Connections: Not on file   Intimate Partner Violence: Not on file   Housing Stability: Not on file     Outpatient Medications Marked as Taking for the 2/16/22 encounter (Consult) with Carlos Manuel Schafer MD   Medication    albuterol (PROVENTIL HFA,VENTOLIN HFA) 90 mcg/act inhaler    calcitriol (ROCALTROL) 0 5 MCG capsule    cyclobenzaprine (FLEXERIL) 10 mg tablet    diphenhydrAMINE (BENADRYL) 25 mg capsule    ergocalciferol (VITAMIN D2) 50,000 units    famotidine (PEPCID) 40 MG tablet    FLUoxetine (PROzac) 40 MG capsule    furosemide (LASIX) 40 mg tablet    lamoTRIgine (LaMICtal) 150 MG tablet    levothyroxine 150 mcg tablet    LORazepam (ATIVAN) 1 mg tablet    losartan (COZAAR) 25 mg tablet    meclizine (ANTIVERT) 25 mg tablet    metoprolol tartrate (LOPRESSOR) 25 mg tablet    Multiple Vitamins-Minerals (CENTRUM SILVER 50+WOMEN PO)    oxyCODONE (ROXICODONE) 10 MG TABS    rosuvastatin (CRESTOR) 5 mg tablet    spironolactone (ALDACTONE) 50 mg tablet    [DISCONTINUED] alendronate (FOSAMAX) 70 mg tablet     Allergies   Allergen Reactions    Banana Extract Allergy Skin Test - Food Allergy Shortness Of Breath     Chest tightness    Charenta Melon [place] Melon) Shortness Of Breath     Cantelope  Chest tightness    Kiwi Extract - Food Allergy Shortness Of Breath     Chest tightness    Aspirin Other (See Comments)    Ibuprofen Hives, Itching and Swelling    Iodinated Diagnostic Agents Hives    Naproxen Hives    Nsaids Other (See Comments)     swollen lips, oral itching    Pneumococcal Vaccine Other (See Comments)    Sulfamethoxazole-Trimethoprim Blisters and Other (See Comments)     oral ulcers      Trimethoprim Other (See Comments)    Ciprofloxacin Rash    Latex Rash and Swelling         Imaging: (2/9/2022)  On transvaginal imagine, the uterus measures 5 8 x 2 3 x 4 6 cm  The endometrium measures 5 mm in thickness  Within the uterine cavity, there is a 13 x 12 x 11 mm polyp with surrounding intracavitary fluid  This is consistent with likely endometrial polyp  The left ovary measures 2 5 x 1 9 x 2 0 cm and appears normal  The right ovary measures 3 0 x 2 5 x 2 0 cm and appears normal       Impression: Endometrial polyp measuring 13 x 12 x 11 mm  Otherwise normal uterus and ovaries  Objective:  /60 (BP Location: Left arm, Patient Position: Sitting, Cuff Size: Large)   Ht 5' 0 75" (1 543 m)   Wt 77 3 kg (170 lb 6 4 oz)   Breastfeeding No   BMI 32 46 kg/m²        General Appearance: alert and oriented, in no acute distress  HEENT: NC/AT, EOMI  CVS: Regular rate and rhythm  Lungs: Normal work of breathing  Abdomen: Soft, non-tender, non-distended, no masses, no rebound or guarding  Extremities: Normal range of motion     Skin: normal, no rash or abnormalities  Neurologic: alert, oriented x3  Psychiatric: Appropriate affect, mood stable, cooperative with exam         Soraya Wilburn MD  2/16/2022 5:32 PM

## 2022-02-16 NOTE — TELEPHONE ENCOUNTER
Please schedule patient for Reclast infusion  Switching from Fosamax  Baseline labs already completed -- see Media tab       Kristina Garcia MD  2/16/2022 3:59 PM

## 2022-02-21 ENCOUNTER — TELEPHONE (OUTPATIENT)
Dept: GASTROENTEROLOGY | Facility: CLINIC | Age: 72
End: 2022-02-21

## 2022-02-21 NOTE — TELEPHONE ENCOUNTER
Per Reclast guidelines vitamin D, creatinine, calcium within 30 days of infusion  Results are from October    Please advise

## 2022-02-21 NOTE — TELEPHONE ENCOUNTER
1st call-lvm for pt to sched combo at [facility] ordered from last ov w/herm-TK HAS PAPERWORK    PT CALLED BACK-is having other testing done first before she reschedules

## 2022-02-21 NOTE — TELEPHONE ENCOUNTER
Please review with patient  Per protocol, will need repeat labs  She is scheduled for PATs on 2/24 prior to upcoming surgery, so can have labs drawn at that time  Orders placed       Luis Flower MD  2/21/2022 11:50 AM

## 2022-02-21 NOTE — TELEPHONE ENCOUNTER
Patient called  Reviewed Dr Preeti Garner recommendations and that per protocol, will need repeat labs for reclast (within 30 days) and she can get it done at the same time on 02/24 for PATs  She verbalized understanding

## 2022-02-24 ENCOUNTER — APPOINTMENT (OUTPATIENT)
Dept: LAB | Facility: HOSPITAL | Age: 72
End: 2022-02-24
Attending: STUDENT IN AN ORGANIZED HEALTH CARE EDUCATION/TRAINING PROGRAM
Payer: MEDICARE

## 2022-02-24 DIAGNOSIS — Z86.39 PERSONAL HISTORY OF OTHER ENDOCRINE, NUTRITIONAL AND METABOLIC DISEASE: ICD-10-CM

## 2022-02-24 DIAGNOSIS — Z01.818 ENCOUNTER FOR PREADMISSION TESTING: ICD-10-CM

## 2022-02-24 DIAGNOSIS — M81.6 LOCALIZED OSTEOPOROSIS WITHOUT CURRENT PATHOLOGICAL FRACTURE: ICD-10-CM

## 2022-02-24 LAB
ABO GROUP BLD: NORMAL
ANION GAP SERPL CALCULATED.3IONS-SCNC: 4 MMOL/L (ref 4–13)
BASOPHILS # BLD AUTO: 0.05 THOUSANDS/ΜL (ref 0–0.1)
BASOPHILS NFR BLD AUTO: 1 % (ref 0–1)
BLD GP AB SCN SERPL QL: NEGATIVE
BUN SERPL-MCNC: 24 MG/DL (ref 5–25)
CALCIUM SERPL-MCNC: 8.7 MG/DL (ref 8.3–10.1)
CHLORIDE SERPL-SCNC: 102 MMOL/L (ref 100–108)
CO2 SERPL-SCNC: 34 MMOL/L (ref 21–32)
CREAT SERPL-MCNC: 0.87 MG/DL (ref 0.6–1.3)
EOSINOPHIL # BLD AUTO: 0.16 THOUSAND/ΜL (ref 0–0.61)
EOSINOPHIL NFR BLD AUTO: 3 % (ref 0–6)
ERYTHROCYTE [DISTWIDTH] IN BLOOD BY AUTOMATED COUNT: 13.2 % (ref 11.6–15.1)
EST. AVERAGE GLUCOSE BLD GHB EST-MCNC: 103 MG/DL
GFR SERPL CREATININE-BSD FRML MDRD: 68 ML/MIN/1.73SQ M
GLUCOSE SERPL-MCNC: 90 MG/DL (ref 65–140)
HBA1C MFR BLD: 5.2 %
HCT VFR BLD AUTO: 44.9 % (ref 34.8–46.1)
HGB BLD-MCNC: 14.2 G/DL (ref 11.5–15.4)
IMM GRANULOCYTES # BLD AUTO: 0.02 THOUSAND/UL (ref 0–0.2)
IMM GRANULOCYTES NFR BLD AUTO: 0 % (ref 0–2)
LYMPHOCYTES # BLD AUTO: 1.64 THOUSANDS/ΜL (ref 0.6–4.47)
LYMPHOCYTES NFR BLD AUTO: 25 % (ref 14–44)
MCH RBC QN AUTO: 29.8 PG (ref 26.8–34.3)
MCHC RBC AUTO-ENTMCNC: 31.6 G/DL (ref 31.4–37.4)
MCV RBC AUTO: 94 FL (ref 82–98)
MONOCYTES # BLD AUTO: 0.39 THOUSAND/ΜL (ref 0.17–1.22)
MONOCYTES NFR BLD AUTO: 6 % (ref 4–12)
NEUTROPHILS # BLD AUTO: 4.23 THOUSANDS/ΜL (ref 1.85–7.62)
NEUTS SEG NFR BLD AUTO: 65 % (ref 43–75)
NRBC BLD AUTO-RTO: 0 /100 WBCS
PLATELET # BLD AUTO: 266 THOUSANDS/UL (ref 149–390)
PMV BLD AUTO: 9.6 FL (ref 8.9–12.7)
POTASSIUM SERPL-SCNC: 3.6 MMOL/L (ref 3.5–5.3)
RBC # BLD AUTO: 4.76 MILLION/UL (ref 3.81–5.12)
RH BLD: POSITIVE
SODIUM SERPL-SCNC: 140 MMOL/L (ref 136–145)
SPECIMEN EXPIRATION DATE: NORMAL
WBC # BLD AUTO: 6.49 THOUSAND/UL (ref 4.31–10.16)

## 2022-02-24 PROCEDURE — 80048 BASIC METABOLIC PNL TOTAL CA: CPT

## 2022-02-24 PROCEDURE — NC001 PR NO CHARGE: Performed by: STUDENT IN AN ORGANIZED HEALTH CARE EDUCATION/TRAINING PROGRAM

## 2022-02-24 PROCEDURE — 83036 HEMOGLOBIN GLYCOSYLATED A1C: CPT

## 2022-02-24 PROCEDURE — 36415 COLL VENOUS BLD VENIPUNCTURE: CPT

## 2022-02-24 PROCEDURE — 86850 RBC ANTIBODY SCREEN: CPT | Performed by: STUDENT IN AN ORGANIZED HEALTH CARE EDUCATION/TRAINING PROGRAM

## 2022-02-24 PROCEDURE — 85025 COMPLETE CBC W/AUTO DIFF WBC: CPT

## 2022-02-24 PROCEDURE — 86901 BLOOD TYPING SEROLOGIC RH(D): CPT | Performed by: STUDENT IN AN ORGANIZED HEALTH CARE EDUCATION/TRAINING PROGRAM

## 2022-02-24 PROCEDURE — 82652 VIT D 1 25-DIHYDROXY: CPT

## 2022-02-24 PROCEDURE — 86900 BLOOD TYPING SEROLOGIC ABO: CPT | Performed by: STUDENT IN AN ORGANIZED HEALTH CARE EDUCATION/TRAINING PROGRAM

## 2022-02-24 NOTE — PRE-PROCEDURE INSTRUCTIONS
Pre-Surgery Instructions:   Medication Instructions    albuterol (PROVENTIL HFA,VENTOLIN HFA) 90 mcg/act inhaler Instructed patient per Anesthesia Guidelines  prn,may use    calcitriol (ROCALTROL) 0 5 MCG capsule Instructed patient per Anesthesia Guidelines  weekly    cyclobenzaprine (FLEXERIL) 10 mg tablet Instructed patient per Anesthesia Guidelines  prn,may use    diphenhydrAMINE (BENADRYL) 25 mg capsule Instructed patient per Anesthesia Guidelines  prn,may use    ergocalciferol (VITAMIN D2) 50,000 units Instructed patient per Anesthesia Guidelines  weekly    famotidine (PEPCID) 40 MG tablet Instructed patient per Anesthesia Guidelines  take am of sx    FLUoxetine (PROzac) 40 MG capsule Instructed patient per Anesthesia Guidelines  take am of sx    furosemide (LASIX) 40 mg tablet Instructed patient per Anesthesia Guidelines  hold am of sx    lamoTRIgine (LaMICtal) 150 MG tablet Instructed patient per Anesthesia Guidelines  take am of sx    levothyroxine 150 mcg tablet Instructed patient per Anesthesia Guidelines  take am of sx    LORazepam (ATIVAN) 1 mg tablet Instructed patient per Anesthesia Guidelines  prn,may take    losartan (COZAAR) 25 mg tablet Instructed patient per Anesthesia Guidelines  hold am of sx    meclizine (ANTIVERT) 25 mg tablet Instructed patient per Anesthesia Guidelines  prn,may use    metoprolol tartrate (LOPRESSOR) 25 mg tablet Instructed patient per Anesthesia Guidelines  take am of sx    misoprostol (CYTOTEC) 100 mcg tablet Patient was instructed by Physician and understands   Multiple Vitamins-Minerals (CENTRUM SILVER 50+WOMEN PO) Instructed patient per Anesthesia Guidelines  holding preop    oxyCODONE (ROXICODONE) 10 MG TABS Instructed patient per Anesthesia Guidelines  prn    rosuvastatin (CRESTOR) 5 mg tablet Instructed patient per Anesthesia Guidelines  will take post op    spironolactone (ALDACTONE) 50 mg tablet Instructed patient per Anesthesia Guidelines  hold am of sx    Zinc 100 MG TABS Instructed patient per Anesthesia Guidelines  hold preop    You will receive a phone call from hospital for arrival time  Please call surgeons office if any changes in your condition  Wear easy on/off clothing; consider type of surgery;  Valuables, jewelry, piercing's please keep at home  **COVID-19  education/surgical guidelines  Updated covid    Visitation policy  Please: No contact lenses or eye make up, artificial eyelashes    Please secure transportation     Follow pre surgery showering or cleaning instructions as  Reviewed by nurse or surgeons office      Questions answered and concerns addressed

## 2022-02-27 LAB — 1,25(OH)2D3 SERPL-MCNC: 103 PG/ML (ref 19.9–79.3)

## 2022-02-28 ENCOUNTER — ANESTHESIA EVENT (OUTPATIENT)
Dept: PERIOP | Facility: HOSPITAL | Age: 72
End: 2022-02-28
Payer: MEDICARE

## 2022-02-28 NOTE — ANESTHESIA PREPROCEDURE EVALUATION
Procedure:  DILATATION AND CURETTAGE (D&C) WITH HYSTEROSCOPY WITH POLYPECTOMY (N/A Uterus)    Relevant Problems   NEURO/PSYCH   (+) History of cervical dysplasia      HPV in female Condyloma   Eczema Migraines   Anxiety Depression   Hyper[facility] ([facility] [address] ) Papanicolaou smear   Hypertension Thyroid cancer ([facility] [address] )   [facility]anoma [facility]        Physical Exam    Airway    Mallampati score: II  TM Distance: >3 FB  Neck ROM: full     Dental   lower dentures and upper dentures,     Cardiovascular      Pulmonary      Other Findings        Anesthesia Plan  ASA Score- 2     Anesthesia Type- IV sedation with anesthesia with ASA Monitors  Additional Monitors:   Airway Plan:           Plan Factors-Exercise tolerance (METS): >4 METS  Chart reviewed  Patient is not a current smoker  Patient not instructed to abstain from smoking on day of procedure  Patient did not smoke on day of surgery  Induction- intravenous  Postoperative Plan-     Informed Consent- Anesthetic plan and risks discussed with patient and spouse  I personally reviewed this patient with the CRNA  Discussed and agreed on the Anesthesia Plan with the CRNA  Gustabo Jacome

## 2022-03-01 ENCOUNTER — ANESTHESIA (OUTPATIENT)
Dept: PERIOP | Facility: HOSPITAL | Age: 72
End: 2022-03-01
Payer: MEDICARE

## 2022-03-01 ENCOUNTER — HOSPITAL ENCOUNTER (OUTPATIENT)
Facility: HOSPITAL | Age: 72
Setting detail: OUTPATIENT SURGERY
Discharge: HOME | End: 2022-03-01
Attending: STUDENT IN AN ORGANIZED HEALTH CARE EDUCATION/TRAINING PROGRAM | Admitting: STUDENT IN AN ORGANIZED HEALTH CARE EDUCATION/TRAINING PROGRAM
Payer: MEDICARE

## 2022-03-01 VITALS
OXYGEN SATURATION: 98 % | WEIGHT: 167.33 LBS | RESPIRATION RATE: 16 BRPM | TEMPERATURE: 97.7 F | BODY MASS INDEX: 31.59 KG/M2 | HEART RATE: 66 BPM | SYSTOLIC BLOOD PRESSURE: 107 MMHG | DIASTOLIC BLOOD PRESSURE: 58 MMHG | HEIGHT: 61 IN

## 2022-03-01 DIAGNOSIS — N84.0 ENDOMETRIAL POLYP: ICD-10-CM

## 2022-03-01 DIAGNOSIS — Z01.818 ENCOUNTER FOR PREADMISSION TESTING: ICD-10-CM

## 2022-03-01 PROCEDURE — 88305 TISSUE EXAM BY PATHOLOGIST: CPT | Performed by: PATHOLOGY

## 2022-03-01 PROCEDURE — 58558 HYSTEROSCOPY BIOPSY: CPT | Performed by: STUDENT IN AN ORGANIZED HEALTH CARE EDUCATION/TRAINING PROGRAM

## 2022-03-01 RX ORDER — LIDOCAINE HYDROCHLORIDE 10 MG/ML
INJECTION, SOLUTION EPIDURAL; INFILTRATION; INTRACAUDAL; PERINEURAL AS NEEDED
Status: DISCONTINUED | OUTPATIENT
Start: 2022-03-01 | End: 2022-03-01

## 2022-03-01 RX ORDER — PROPOFOL 10 MG/ML
INJECTION, EMULSION INTRAVENOUS AS NEEDED
Status: DISCONTINUED | OUTPATIENT
Start: 2022-03-01 | End: 2022-03-01

## 2022-03-01 RX ORDER — OXYCODONE HYDROCHLORIDE 5 MG/1
5 TABLET ORAL EVERY 6 HOURS PRN
Status: DISCONTINUED | OUTPATIENT
Start: 2022-03-01 | End: 2022-03-01 | Stop reason: HOSPADM

## 2022-03-01 RX ORDER — ONDANSETRON 2 MG/ML
4 INJECTION INTRAMUSCULAR; INTRAVENOUS ONCE AS NEEDED
Status: COMPLETED | OUTPATIENT
Start: 2022-03-01 | End: 2022-03-01

## 2022-03-01 RX ORDER — SODIUM CHLORIDE, SODIUM LACTATE, POTASSIUM CHLORIDE, CALCIUM CHLORIDE 600; 310; 30; 20 MG/100ML; MG/100ML; MG/100ML; MG/100ML
125 INJECTION, SOLUTION INTRAVENOUS CONTINUOUS
Status: DISCONTINUED | OUTPATIENT
Start: 2022-03-01 | End: 2022-03-01 | Stop reason: HOSPADM

## 2022-03-01 RX ORDER — EPHEDRINE SULFATE 50 MG/ML
INJECTION INTRAVENOUS AS NEEDED
Status: DISCONTINUED | OUTPATIENT
Start: 2022-03-01 | End: 2022-03-01

## 2022-03-01 RX ORDER — MIDAZOLAM HYDROCHLORIDE 2 MG/2ML
INJECTION, SOLUTION INTRAMUSCULAR; INTRAVENOUS AS NEEDED
Status: DISCONTINUED | OUTPATIENT
Start: 2022-03-01 | End: 2022-03-01

## 2022-03-01 RX ORDER — LIDOCAINE HYDROCHLORIDE 10 MG/ML
0.5 INJECTION, SOLUTION EPIDURAL; INFILTRATION; INTRACAUDAL; PERINEURAL ONCE AS NEEDED
Status: DISCONTINUED | OUTPATIENT
Start: 2022-03-01 | End: 2022-03-01 | Stop reason: HOSPADM

## 2022-03-01 RX ORDER — METOCLOPRAMIDE HYDROCHLORIDE 5 MG/ML
10 INJECTION INTRAMUSCULAR; INTRAVENOUS ONCE
Status: COMPLETED | OUTPATIENT
Start: 2022-03-01 | End: 2022-03-01

## 2022-03-01 RX ORDER — FENTANYL CITRATE/PF 50 MCG/ML
25 SYRINGE (ML) INJECTION
Status: DISCONTINUED | OUTPATIENT
Start: 2022-03-01 | End: 2022-03-01 | Stop reason: HOSPADM

## 2022-03-01 RX ORDER — FENTANYL CITRATE 50 UG/ML
INJECTION, SOLUTION INTRAMUSCULAR; INTRAVENOUS AS NEEDED
Status: DISCONTINUED | OUTPATIENT
Start: 2022-03-01 | End: 2022-03-01

## 2022-03-01 RX ORDER — ONDANSETRON 2 MG/ML
INJECTION INTRAMUSCULAR; INTRAVENOUS AS NEEDED
Status: DISCONTINUED | OUTPATIENT
Start: 2022-03-01 | End: 2022-03-01

## 2022-03-01 RX ORDER — MAGNESIUM HYDROXIDE 1200 MG/15ML
LIQUID ORAL AS NEEDED
Status: DISCONTINUED | OUTPATIENT
Start: 2022-03-01 | End: 2022-03-01 | Stop reason: HOSPADM

## 2022-03-01 RX ORDER — ACETAMINOPHEN 325 MG/1
975 TABLET ORAL EVERY 6 HOURS PRN
Status: DISCONTINUED | OUTPATIENT
Start: 2022-03-01 | End: 2022-03-01 | Stop reason: HOSPADM

## 2022-03-01 RX ADMIN — FENTANYL CITRATE 25 MCG: 50 INJECTION, SOLUTION INTRAMUSCULAR; INTRAVENOUS at 10:28

## 2022-03-01 RX ADMIN — FENTANYL CITRATE 25 MCG: 50 INJECTION, SOLUTION INTRAMUSCULAR; INTRAVENOUS at 09:21

## 2022-03-01 RX ADMIN — EPHEDRINE SULFATE 10 MG: 50 INJECTION INTRAVENOUS at 09:01

## 2022-03-01 RX ADMIN — FENTANYL CITRATE 25 MCG: 50 INJECTION, SOLUTION INTRAMUSCULAR; INTRAVENOUS at 09:12

## 2022-03-01 RX ADMIN — SODIUM CHLORIDE, SODIUM LACTATE, POTASSIUM CHLORIDE, AND CALCIUM CHLORIDE 125 ML/HR: .6; .31; .03; .02 INJECTION, SOLUTION INTRAVENOUS at 07:49

## 2022-03-01 RX ADMIN — FENTANYL CITRATE 25 MCG: 50 INJECTION, SOLUTION INTRAMUSCULAR; INTRAVENOUS at 10:13

## 2022-03-01 RX ADMIN — ONDANSETRON 4 MG: 2 INJECTION INTRAMUSCULAR; INTRAVENOUS at 10:05

## 2022-03-01 RX ADMIN — MIDAZOLAM 2 MG: 1 INJECTION INTRAMUSCULAR; INTRAVENOUS at 08:40

## 2022-03-01 RX ADMIN — ONDANSETRON 4 MG: 2 INJECTION INTRAMUSCULAR; INTRAVENOUS at 09:03

## 2022-03-01 RX ADMIN — FENTANYL CITRATE 50 MCG: 50 INJECTION, SOLUTION INTRAMUSCULAR; INTRAVENOUS at 08:53

## 2022-03-01 RX ADMIN — METOCLOPRAMIDE HYDROCHLORIDE 10 MG: 5 INJECTION INTRAMUSCULAR; INTRAVENOUS at 10:34

## 2022-03-01 RX ADMIN — PROPOFOL 200 MG: 10 INJECTION, EMULSION INTRAVENOUS at 08:46

## 2022-03-01 RX ADMIN — PROPOFOL 50 MG: 10 INJECTION, EMULSION INTRAVENOUS at 08:53

## 2022-03-01 RX ADMIN — LIDOCAINE HYDROCHLORIDE 50 MG: 10 INJECTION, SOLUTION EPIDURAL; INFILTRATION; INTRACAUDAL; PERINEURAL at 08:46

## 2022-03-01 NOTE — DISCHARGE INSTRUCTIONS
[facility] [place]    Postoperative Instructions: Hysteroscopy with Polypectomy    What can I expect after the procedure? Immediately after the procedure, some women may experience some cramping, mild pain, nausea and/or vomiting  Many women can resume normal activities within a day or so  Be sure to follow any instructions from your doctor, no matter how good youre feeling  A watery and/or bloody discharge following your procedure is normal  You should expect bleeding for the first 1-2 weeks after surgery  Bleeding may be irregular, and may increase with activity  Anticipated post-procedural symptoms  Commonly reported postoperative events include the following:   Cramping/pelvic pain  This cramping will typically be worst on the day of the procedure, but may last for a couple of days  For pain, you may take over-the-counter acetaminophen (Tylenol) and ibuprofen (Motrin/Advil) as needed  Take only as directed on the medication label   Nausea and vomiting  This is common immediately following the procedure and is due to the anesthesia that you received during surgery   Vaginal discharge   Vaginal bleeding/spotting    Call you doctor if you have any questions or concerns  Very few patients experience complications following hysteroscopy with polypectomy  However, you should call your doctor right away if you develop:   A fever higher than 100 4°F   Worsening pelvic pain that is not relieved by ibuprofen or  other prescribed medicine   Worsening or persistent nausea or vomiting   Chest pain, shortness of breath, or dizziness   Bowel or bladder problems   Foul-smelling vaginal discharge  Discharge with a red, yellow, or brown tinge is normal    brownish is normal)    Post-operative follow up   You should call [address] [place] [address] Ob/Gyn to schedule a post-operative visit in approximately 2 weeks     Surgical pathology results are typically available in approximately 7-10 days and will be visible to you on [facility] MyChart  These results will be reviewed with you further at the time of your post-operative visit  You will receive a call if there are any unexpected or concerning pathology findings

## 2022-03-01 NOTE — OP NOTE
OPERATIVE REPORT  PATIENT NAME: Emmie Fair    :  1950  MRN: [id number]  Pt Location: [facility] OR ROOM 04    SURGERY DATE: 3/1/2022    Surgeon(s) and Role:     Uvaldo Torres MD - Primary    Preop Diagnosis:  Endometrial polyp [N84 0]  Cervical stenosis (uterine cervix) [N88 2]    Post-Op Diagnosis Codes:     * Endometrial polyp [N84 0]     * Cervical stenosis (uterine cervix) [N88 2]    Procedure(s) (LRB):  HYSTEROSCOPY WITH POLYPECTOMY (N/A)    Specimen(s):  ID Type Source Tests Collected by Time Destination   1 : Endometrial polyp Tissue Endometrium TISSUE EXAM Gualberto Fernandez MD 3/1/2022 0396        Estimated Blood Loss:   Minimal    Drains: None    Fluid deficit: 300 mL sterile saline    Anesthesia Type: General via LMA    Operative Indications:  Endometrial polyp [N84 0]    Operative Findings:  Normal external genitalia  On bimanual exam, uterus small, anteverted, midline, mobile  No adnexal masses  On speculum exam, atrophic vaginal mucosa noted  Cervix flush with surrounding mucosa  Stenotic cervical os  On hysteroscopic examination, a solitary endometrial polyp versus submucous fibroid was noted along the posterior aspect of the uterine cavity and to the left of midline  Otherwise thin, atrophic endometrium noted  The intracavitary lesion was resected in its entirety using the Symphion device under continuous direct hysteroscopic visualization, with excellent distension of the cavity noted throughout  An approximately 1 to 1 5 cm transverse linear defect of the vaginal mucosa at the midline within the posterior fornix of the vagina noted at site of perforation, which was repaired at the end of the case  Small cervical laceration was noted at the site of tenaculum placement and repaired at the end of the case       Complications:   Blunt perforation of posterior fornix of the vagina with entry into the retroperitoneal space    Procedure and Technique:  The patient was taken to the operating room where general anesthesia was administered via LMA  She was prepped and draped in the usual sterile fashion in the dorsal lithotomy position  A patient safety time-out was performed with all members of the operating room team present  Examination under anesthesia revealed findings as noted above  A bivalve speculum was placed to allow for good visualization of the cervix  An [place] was then placed on the anterior lip of the cervix  In the setting of cervical stenosis, pediatric cervical dilators were initially utilized to carefully dilate the cervix to ultimately accommodate a #17 Mosquera dilator  The cervix was directly visualized throughout the process of cervical dilation, without any suspicion of uterine perforation  Attempt was then made to introduce a 6 5 mm diameter 0 degree Symphion hysteroscope into the uterine cavity through the endocervical canal  The hysteroscope passed with little resistance  Upon placement of the hysteroscope, wispy alveolar fibers were noted along with a thin translucent membrane through which omentum was visualized  Retroperitoneal placement of the hysteroscope was promptly identified and the hysteroscope was immediately removed  Visual inspection of the cervix and vagina revealed a transverse linear defect of the vaginal mucosa at the midline within the posterior fornix as noted above  A second attempt at introduction of the hysteroscope was then made, taking care to visually confirm endocervical placement prior to advancing the camera  Upon confirming placement within the endocervical canal, the crystalloid solution distending medium was turned on  The hysteroscope was then advanced through the endocervical canal  Excellent distention of the uterine cavity was noted immediately, with no suspicion of uterine perforation  Diagnostic findings were as noted above   Once adequate visualization of the uterine cavity have been achieved with no suspicion of uterine perforation, the Symphion operative device was then introduced through the operative port of the hysteroscope  The intracavitary polyp versus submucous fibroid was then resected under continuous direct visualization using the Symphion device  Excellent uterine distention was maintained throughout, with excellent visualization of the operative field  The hysteroscope was then removed, taking care to visually inspect the full length of the endocervical canal  There was no evidence of false tract or suspicion or occult perforation of the endocervix or uterus  Given solitary intracavitary lesion with otherwise thin appearance of the endometrium, a uterine curettage was not performed  Upon completion, the tenaculum was removed from the anterior lip of the cervix and good hemostasis was noted  At this point, the mucosal defect within the posterior fornix was again inspected and noted to be hemostatic  Peritoneum was visualized through the defect and appeared to be intact  The mucosal defect was then closed using two figure-of-eight stitches of 2-0 vicryl suture  A single interrupted stitch of 2-0 vicryl suture was placed at the tenaculum site on the anterior lip of the cervix due to presence of a small cervical laceration  Excellent hemostasis was noted at this time  All instruments were then removed from the vagina  Lap, sponge, needle, and instrument counts were correct  The patient tolerated the procedure well and was taken to the recovery room in stable condition  I was present for the entire procedure and performed the entirety of the case  Patient Disposition:  PACU      Regarding perforation of the posterior vaginal fornix into the retroperitoneum, there was low suspicion of entry into the peritoneal cavity  No sharp instruments were introduced through the mucosal defect at any point, and there was no evidence or clinical suspicion of visceral injury   Perforation was felt to have occurred with introduction of the hysteroscope rather than during the process of cervical dilation  The Symphion operative device was only utilized after the integrity of the uterus had been assessed and intrauterine placement of the hysteroscope visually confirmed  Following completion of the procedure, intra-operative findings and perforation of the posterior fornix of the vagina were reviewed with the patient and her   Both were given opportunity to ask questions  All questions were answered to their apparent satisfaction      SIGNATURE: Keeley eZe MD  DATE: March 1, 2022  TIME: 11:31 AM

## 2022-03-01 NOTE — ANESTHESIA POSTPROCEDURE EVALUATION
Post-Op Assessment Note    CV Status:  Stable  Pain Score: 0    Pain management: adequate     Mental Status:  Sleepy   Hydration Status:  Stable   PONV Controlled:  None   Airway Patency:  Patent      Post Op Vitals Reviewed: Yes      Staff: CRNA         No complications documented      BP (P) 140/63 (03/01/22 0943)    Temp (!) (P) 97 1 °F (36 2 °C) (03/01/22 0943)    Pulse (P) 75 (03/01/22 0943)   Resp (P) 16 (03/01/22 0943)    SpO2 (P) 100 % (03/01/22 0943)

## 2022-03-01 NOTE — INTERVAL H&P NOTE
H&P reviewed  After examining the patient I find no changes in the patients condition since the H&P had been written      Vitals:    03/01/22 0740   BP: 142/64   Pulse: 80   Resp: 16   Temp: 98 2 °F (36 8 °C)   SpO2: 98%     Jared Pope MD  3/1/2022 8:23 AM

## 2022-03-01 NOTE — INTERIM OP NOTE
HYSTEROSCOPY WITH POLYPECTOMY  Postoperative Note  PATIENT NAME: Ana Clemons  : 1950  MRN: [id number]  [facility] OR ROOM 04    Surgery Date: 3/1/2022    Preop Diagnosis:  Endometrial polyp [N84 0]  Stenosis of uterine cervix    Post-Op Diagnosis Codes:     * Endometrial polyp [N84 0]     * Stenosis of uterine cervix    Procedure(s) (LRB):  HYSTEROSCOPY WITH POLYPECTOMY (N/A)    Surgeon(s) and Role:     * Khai Stinson MD - Primary    Specimens:  ID Type Source Tests Collected by Time Destination   1 : Endometrial polyp Tissue Endometrium TISSUE EXAM Khai Stinson MD 3/1/2022 0778        Estimated Blood Loss:   Minimal    Anesthesia Type:   IV Sedation with Anesthesia     Findings:   Normal external genitalia  On bimanual exam, uterus small, anteverted, midline, mobile  No adnexal masses  On speculum exam, atrophic vaginal mucosa noted  Cervix flush with surrounding mucosa  Stenotic cervical os  On hysteroscopic examination, a solitary endometrial polyp versus submucous fibroid was noted along the posterior aspect of the uterine cavity and to the left of midline  Otherwise thin, atrophic endometrium noted  The intracavitary lesion was resected in its entirety using the Symphion device under continuous direct hysteroscopic visualization, with excellent distension of the cavity noted throughout  Complications:   Partial-thickness perforation of posterior cul-de-sac  See final operative note for details      SIGNATURE: Jose Dwyer MD   DATE: 2022   TIME: 9:47 AM

## 2022-03-09 ENCOUNTER — TELEPHONE (OUTPATIENT)
Dept: OBGYN CLINIC | Facility: CLINIC | Age: 72
End: 2022-03-09

## 2022-03-09 NOTE — TELEPHONE ENCOUNTER
Called patient to review results of surgical pathology  Reviewed benign endometrial polyp  All questions answered  Patient reports that she is recovering well  No pain or cramping  Light spotting continues   She has follow up with me on Monday, 3/14 for routine post-op exam      Selma Tracy MD  3/9/2022 6:23 PM

## 2022-03-14 ENCOUNTER — OFFICE VISIT (OUTPATIENT)
Dept: OBGYN CLINIC | Facility: CLINIC | Age: 72
End: 2022-03-14

## 2022-03-14 VITALS
SYSTOLIC BLOOD PRESSURE: 120 MMHG | HEIGHT: 61 IN | DIASTOLIC BLOOD PRESSURE: 60 MMHG | WEIGHT: 171 LBS | BODY MASS INDEX: 32.28 KG/M2

## 2022-03-14 DIAGNOSIS — N88.2 CERVICAL STENOSIS (UTERINE CERVIX): ICD-10-CM

## 2022-03-14 DIAGNOSIS — Z09 POSTOPERATIVE EXAMINATION: ICD-10-CM

## 2022-03-14 DIAGNOSIS — N95.2 VAGINAL ATROPHY: ICD-10-CM

## 2022-03-14 DIAGNOSIS — N84.0 ENDOMETRIAL POLYP: Primary | ICD-10-CM

## 2022-03-14 PROCEDURE — 1124F ACP DISCUSS-NO DSCNMKR DOCD: CPT | Performed by: STUDENT IN AN ORGANIZED HEALTH CARE EDUCATION/TRAINING PROGRAM

## 2022-03-14 PROCEDURE — 99024 POSTOP FOLLOW-UP VISIT: CPT | Performed by: STUDENT IN AN ORGANIZED HEALTH CARE EDUCATION/TRAINING PROGRAM

## 2022-03-14 NOTE — PROGRESS NOTES
[facility] [address] [facility]    Assessment/Plan:  1  Endometrial polyp  Comments:  Benign pathology results reviewed with patient  Call if any bleeding  Otherwise, may resume routine well care  2  Postoperative examination  Comments:  Recovering appropriately post-op  Given vaginal atrophy mucosal stitches x 2, recommend pelvic rest x 2 additional weeks, then may resume normal activity  3  Cervical stenosis (uterine cervix)        Subjective:   Sophie Keyes is a 73 y o  [id number] now POD#13 s/p hysteroscopy with polypecomy who presents for routine post-op visit  CC:   Chief Complaint   Patient presents with    Post-op     Date of surgery: 3/1/2022  Procedure: Hysteroscopy with polypectomy  Pathology:   A  Endometrial polyp (polypectomy):     - Portions of endometrial polyp; negative for atypia and malignancy  HPI: Patient presents for routine post-op visit  She is without complaint today  Denies vaginal bleeding, discharge, abdominal pain, fever, changes in urinary or bowel patterns       ROS: Negative except as noted in HPI    The following portions of the patient's history were reviewed and updated as appropriate:   Past Medical History:   Diagnosis Date    Anxiety     Condyloma     Depression     Eczema     HPV in female     [facility] [address] [facility]     Hypertension     [facility]anoma ([facility] [address] [facility]     Migraines     Papanicolaou smear 04/13/2018    Thyroid cancer ([facility] [address] [facility]      Past Surgical History:   Procedure Laterality Date    ABDOMINOPLASTY  1985    BARIATRIC SURGERY  2003    CERVICAL CONE BIOPSY      [address]  2014    DILATION AND EVACUATION  1986    DXA PROCEDURE (HISTORICAL)  2017    HERNIA REPAIR      [facility]    [place] HYSTEROSCOPY,W/ENDO BX N/A 3/1/2022    Procedure: HYSTEROSCOPY WITH POLYPECTOMY;  Surgeon: Taylor Goss MD;  Location:  MAIN OR;  Service: Gynecology    SINUS SURGERY  THYROIDECTOMY, PARTIAL      TONSILLECTOMY      TUBAL LIGATION       Family History   Problem Relation Age of Onset    Diabetes Mother     Hypertension Mother     Hypertension Father     Diabetes Father     Diabetes Maternal Grandmother     Heart disease Maternal Grandfather     No Known Problems Paternal Grandmother     No Known Problems Paternal Grandfather     Asthma Son     Raynaud syndrome Son     Breast cancer Neg Hx     Ovarian cancer Neg Hx     Colon cancer Neg Hx     Uterine cancer Neg Hx      Social History     Socioeconomic History    Marital status: /Civil Union     Spouse name: None    Number of children: None    Years of education: None    Highest education level: None   Occupational History    Occupation: Retired    Tobacco Use    Smoking status: Never Smoker    Smokeless tobacco: Never Used   Vaping Use    Vaping Use: Never used   Substance and Sexual Activity    Alcohol use: Never     Comment: Does not drink alcohol - As per [employer]     Drug use: Never     Comment: No - As per [employer]     Sexual activity: Yes     Partners: Male     Birth control/protection: Female Sterilization     Comment: no new partner in 28 years   Other Topics Concern    None   Social History Narrative    Exercise: Occasionally    Domestic violence: No     Social Determinants of Health     Financial Resource Strain: Not on file   Food Insecurity: Not on file   Transportation Needs: Not on file   Physical Activity: Not on file   Stress: Not on file   Social Connections: Not on file   Intimate Partner Violence: Not on file   Housing Stability: Not on file     Outpatient Medications Marked as Taking for the 3/14/22 encounter (Office Visit) with Priya Kidd MD   Medication    albuterol (PROVENTIL HFA,VENTOLIN HFA) 90 mcg/act inhaler    calcitriol (ROCALTROL) 0 5 MCG capsule    cyclobenzaprine (FLEXERIL) 10 mg tablet    diphenhydrAMINE (BENADRYL) 25 mg capsule    ergocalciferol (VITAMIN D2) 50,000 units    famotidine (PEPCID) 40 MG tablet    FLUoxetine (PROzac) 40 MG capsule    furosemide (LASIX) 40 mg tablet    lamoTRIgine (LaMICtal) 150 MG tablet    levothyroxine 150 mcg tablet    LORazepam (ATIVAN) 1 mg tablet    losartan (COZAAR) 25 mg tablet    meclizine (ANTIVERT) 25 mg tablet    metoprolol tartrate (LOPRESSOR) 25 mg tablet    Multiple Vitamins-Minerals (CENTRUM SILVER 50+WOMEN PO)    oxyCODONE (ROXICODONE) 10 MG TABS    rosuvastatin (CRESTOR) 5 mg tablet    spironolactone (ALDACTONE) 50 mg tablet    Zinc 100 MG TABS     Allergies   Allergen Reactions    Banana Extract Allergy Skin Test - Food Allergy Shortness Of Breath     Chest tightness    Charentais Melon (Azeri Melon) Shortness Of Breath     Cantelope  Chest tightness    Ibuprofen Hives, Itching and Swelling    Kiwi Extract - Food Allergy Shortness Of Breath     Chest tightness    Iodinated Diagnostic Agents Hives    Naproxen Hives    Aspirin Other (See Comments)    Nsaids Other (See Comments)     swollen lips, oral itching    Pneumococcal Vaccine Other (See Comments)    Sulfamethoxazole-Trimethoprim Blisters and Other (See Comments)     oral ulcers      Trimethoprim Other (See Comments)    Ciprofloxacin Rash    Latex Rash and Swelling           Objective:  /60   Ht 5' 0 75" (1 543 m)   Wt 77 6 kg (171 lb)   Breastfeeding No   BMI 32 58 kg/m²        Chaperone present? Yes: Daryle Dadds, MA  General Appearance: alert and oriented, in no acute distress  Abdomen: Soft, non-tender, non-distended, no masses, no rebound or guarding  Pelvic:       External genitalia: Normal appearance, no abnormal pigmentation, no lesions or masses  Normal Bartholin's and Ila's  Urinary system: Urethral meatus normal, bladder non-tender  Vaginal: Atrophic mucosa without prolapse or lesions  Normal-appearing physiologic discharge  Mucosal stitch remains intact        Cervix: Atrophic appearing, well-epithelialized, no gross lesions or masses  No cervical motion tenderness  Stitch remains intact at tenaculum site  Adnexa: No adnexal masses or tenderness noted  Uterus: Normal-sized, regular contour, midline, mobile, no uterine tenderness  Extremities: Normal range of motion     Skin: normal, no rash or abnormalities  Neurologic: alert, oriented x3  Psychiatric: Appropriate affect, mood stable, cooperative with exam         Go Tran MD  3/14/2022 2:07 PM

## 2022-03-31 NOTE — TELEPHONE ENCOUNTER
Herm-pt was contacted to schedule combo ordred from last ov with no success   Please advise-thank you

## 2022-04-06 ENCOUNTER — TELEPHONE (OUTPATIENT)
Dept: GASTROENTEROLOGY | Facility: CLINIC | Age: 72
End: 2022-04-06

## 2022-05-04 ENCOUNTER — TELEPHONE (OUTPATIENT)
Dept: GASTROENTEROLOGY | Facility: CLINIC | Age: 72
End: 2022-05-04

## 2022-05-04 DIAGNOSIS — D64.9 ANEMIA, UNSPECIFIED TYPE: Primary | ICD-10-CM

## 2022-05-06 RX ORDER — SODIUM PICOSULFATE, MAGNESIUM OXIDE, AND ANHYDROUS CITRIC ACID 10; 3.5; 12 MG/160ML; G/160ML; G/160ML
LIQUID ORAL
Qty: 320 ML | Refills: 0 | Status: SHIPPED | COMMUNITY
Start: 2022-05-06 | End: 2022-06-15 | Stop reason: HOSPADM

## 2022-05-06 NOTE — TELEPHONE ENCOUNTER
Scheduled date of colonoscopy (as of today): 5/12/22  Physician performing colonoscopy: Dr Latonya Haile  Location of colonoscopy: [place]  Bowel prep reviewed with patient: Clenpiq  Instructions reviewed with patient by: Phyllis Close  Clearances: none

## 2022-05-12 ENCOUNTER — HOSPITAL ENCOUNTER (OUTPATIENT)
Dept: GASTROENTEROLOGY | Facility: HOSPITAL | Age: 72
Setting detail: OUTPATIENT SURGERY
Discharge: HOME | End: 2022-05-12
Attending: INTERNAL MEDICINE
Payer: MEDICARE

## 2022-05-12 ENCOUNTER — ANESTHESIA (OUTPATIENT)
Dept: GASTROENTEROLOGY | Facility: HOSPITAL | Age: 72
End: 2022-05-12

## 2022-05-12 ENCOUNTER — ANESTHESIA EVENT (OUTPATIENT)
Dept: GASTROENTEROLOGY | Facility: HOSPITAL | Age: 72
End: 2022-05-12

## 2022-05-12 VITALS
HEIGHT: 61 IN | HEART RATE: 72 BPM | OXYGEN SATURATION: 100 % | WEIGHT: 161 LBS | SYSTOLIC BLOOD PRESSURE: 120 MMHG | BODY MASS INDEX: 30.4 KG/M2 | DIASTOLIC BLOOD PRESSURE: 62 MMHG | TEMPERATURE: 98.3 F | RESPIRATION RATE: 12 BRPM

## 2022-05-12 DIAGNOSIS — R63.4 UNINTENTIONAL WEIGHT LOSS: ICD-10-CM

## 2022-05-12 DIAGNOSIS — D64.9 ANEMIA, UNSPECIFIED TYPE: ICD-10-CM

## 2022-05-12 PROCEDURE — 43239 EGD BIOPSY SINGLE/MULTIPLE: CPT | Performed by: INTERNAL MEDICINE

## 2022-05-12 PROCEDURE — 45330 DIAGNOSTIC SIGMOIDOSCOPY: CPT | Performed by: INTERNAL MEDICINE

## 2022-05-12 PROCEDURE — 88305 TISSUE EXAM BY PATHOLOGIST: CPT | Performed by: PATHOLOGY

## 2022-05-12 RX ORDER — LIDOCAINE HYDROCHLORIDE 10 MG/ML
INJECTION, SOLUTION EPIDURAL; INFILTRATION; INTRACAUDAL; PERINEURAL AS NEEDED
Status: DISCONTINUED | OUTPATIENT
Start: 2022-05-12 | End: 2022-05-12

## 2022-05-12 RX ORDER — PROPOFOL 10 MG/ML
INJECTION, EMULSION INTRAVENOUS CONTINUOUS PRN
Status: DISCONTINUED | OUTPATIENT
Start: 2022-05-12 | End: 2022-05-12

## 2022-05-12 RX ORDER — PROPOFOL 10 MG/ML
INJECTION, EMULSION INTRAVENOUS AS NEEDED
Status: DISCONTINUED | OUTPATIENT
Start: 2022-05-12 | End: 2022-05-12

## 2022-05-12 RX ORDER — SODIUM CHLORIDE 9 MG/ML
INJECTION, SOLUTION INTRAVENOUS CONTINUOUS PRN
Status: DISCONTINUED | OUTPATIENT
Start: 2022-05-12 | End: 2022-05-12

## 2022-05-12 RX ADMIN — SODIUM CHLORIDE: 9 INJECTION, SOLUTION INTRAVENOUS at 07:14

## 2022-05-12 RX ADMIN — PROPOFOL 120 MG: 10 INJECTION, EMULSION INTRAVENOUS at 07:34

## 2022-05-12 RX ADMIN — PROPOFOL 100 MCG/KG/MIN: 10 INJECTION, EMULSION INTRAVENOUS at 07:34

## 2022-05-12 RX ADMIN — LIDOCAINE HYDROCHLORIDE 100 MG: 10 INJECTION, SOLUTION EPIDURAL; INFILTRATION; INTRACAUDAL; PERINEURAL at 07:34

## 2022-05-12 NOTE — ANESTHESIA POSTPROCEDURE EVALUATION
Post-Op Assessment Note    CV Status:  Stable  Pain Score: 0    Pain management: adequate     Mental Status:  Alert and awake   Hydration Status:  Euvolemic   PONV Controlled:  Controlled   Airway Patency:  Patent      Post Op Vitals Reviewed: Yes      Staff: CRNA         No complications documented      /57 (05/12/22 0753)    Temp 98 3 °F (36 8 °C) (05/12/22 0753)    Pulse 66 (05/12/22 0753)   Resp 12 (05/12/22 0753)    SpO2 100 % (05/12/22 0753)

## 2022-05-12 NOTE — ANESTHESIA PREPROCEDURE EVALUATION
Procedure:  COLONOSCOPY  EGD    Relevant Problems   CARDIO   (+) Hypertension      ENDO   (+) Hyperparathyroidism (HCC)      NEURO/PSYCH   (+) Anxiety   (+) History of cervical dysplasia        Physical Exam    Airway    Mallampati score: II  TM Distance: >3 FB  Neck ROM: full     Dental   upper dentures and lower dentures,     Cardiovascular  Cardiovascular exam normal    Pulmonary  Pulmonary exam normal     Other Findings        Anesthesia Plan  ASA Score- 2     Anesthesia Type- IV sedation with anesthesia with ASA Monitors  Additional Monitors:   Airway Plan:           Plan Factors-    Chart reviewed  Patient summary reviewed  Patient is not a current smoker  Induction- intravenous  Postoperative Plan-     Informed Consent- Anesthetic plan and risks discussed with patient  I personally reviewed this patient with the CRNA  Discussed and agreed on the Anesthesia Plan with the CRNA  Nisa Tatum

## 2022-05-12 NOTE — H&P
History and Physical -  [facility]  Haley Fried 73 y o  female MRN: [id number]    HPI: Haley Fried is a 73y o  year old female who presents for EGD colonoscopy for anemia and weight loss  REVIEW OF SYSTEMS: Per the HPI, and otherwise unremarkable      Historical Information   Past Medical History:   Diagnosis Date    Anxiety     Condyloma     Depression     Eczema     HPV in female     [facility] [address]     Hypertension     Melanoma ([facility] [address]     Migraines     Papanicolaou smear 2018    Thyroid cancer [facility])      Past Surgical History:   Procedure Laterality Date    ABDOMINOPLASTY  1985    BARIATRIC SURGERY      CERVICAL CONE BIOPSY       SECTION   &     COLONOSCOPY      DILATION AND EVACUATION  1986    DXA PROCEDURE (HISTORICAL)      HERNIA REPAIR      MAMMO (HISTORICAL)      [facility]    [place] HYSTEROSCOPY,W/ENDO BX N/A 3/1/2022    Procedure: HYSTEROSCOPY WITH POLYPECTOMY;  Surgeon: Sheila Quezada MD;  Location:  MAIN OR;  Service: Gynecology    SINUS SURGERY      THYROIDECTOMY, PARTIAL      TONSILLECTOMY      TUBAL LIGATION       Social History   Social History     Substance and Sexual Activity   Alcohol Use Never    Comment: Does not drink alcohol - As per [employer]      Social History     Substance and Sexual Activity   Drug Use Never    Comment: No - As per [employer]      Social History     Tobacco Use   Smoking Status Never Smoker   Smokeless Tobacco Never Used     Family History   Problem Relation Age of Onset    Diabetes Mother     Hypertension Mother     Hypertension Father     Diabetes Father     Diabetes Maternal Grandmother     Heart disease Maternal Grandfather     No Known Problems Paternal Grandmother     No Known Problems Paternal Grandfather     Asthma Son     Raynaud syndrome Son     Breast cancer Neg Hx     Ovarian cancer Neg Hx     Colon cancer Neg Hx     Uterine cancer Neg Hx Meds/Allergies       Current Outpatient Medications:     calcitriol (ROCALTROL) 0 5 MCG capsule    cyclobenzaprine (FLEXERIL) 10 mg tablet    diphenhydrAMINE (BENADRYL) 25 mg capsule    ergocalciferol (VITAMIN D2) 50,000 units    FLUoxetine (PROzac) 40 MG capsule    furosemide (LASIX) 40 mg tablet    lamoTRIgine (LaMICtal) 150 MG tablet    levothyroxine 150 mcg tablet    LORazepam (ATIVAN) 1 mg tablet    losartan (COZAAR) 25 mg tablet    meclizine (ANTIVERT) 25 mg tablet    metoprolol tartrate (LOPRESSOR) 25 mg tablet    Multiple Vitamins-Minerals (CENTRUM SILVER 50+WOMEN PO)    oxyCODONE (ROXICODONE) 10 MG TABS    Sod Picosulfate-Mag Ox-Cit Acd (Clenpiq) 10-3 5-12 MG-GM -GM/160ML SOLN    spironolactone (ALDACTONE) 50 mg tablet    Zinc 100 MG TABS    albuterol (PROVENTIL HFA,VENTOLIN HFA) 90 mcg/act inhaler    famotidine (PEPCID) 40 MG tablet    misoprostol (CYTOTEC) 100 mcg tablet    rosuvastatin (CRESTOR) 5 mg tablet    Allergies   Allergen Reactions    Banana Extract Allergy Skin Test - Food Allergy Shortness Of Breath     Chest tightness    Charentais Melon (Kiswahili Melon) Shortness Of Breath     Cantelope  Chest tightness    Ibuprofen Hives, Itching and Swelling    Kiwi Extract - Food Allergy Shortness Of Breath     Chest tightness    Iodinated Diagnostic Agents Hives    Naproxen Hives    Aspirin Other (See Comments)    Nsaids Other (See Comments)     swollen lips, oral itching    Pneumococcal Vaccine Other (See Comments)    Sulfamethoxazole-Trimethoprim Blisters and Other (See Comments)     oral ulcers      Trimethoprim Other (See Comments)    Ciprofloxacin Rash    Latex Rash and Swelling       Objective     /58   Pulse 73   Temp 98 2 °F (36 8 °C) (Oral)   Resp 18   Ht 5' 1" (1 549 m)   Wt 73 kg (161 lb)   SpO2 97%   BMI 30 42 kg/m²     PHYSICAL EXAM    Gen: NAD AAOx3  Head: Normocephalic, Atraumatic  CV: S1S2 RRR no m/r/g  CHEST: Clear b/l no c/r/w  ABD: soft, +BS NT/ND  EXT: no edema    ASSESSMENT/PLAN:  This is a 73y o  year old female here for EGD colonoscopy, and she is stable and optimized for her procedure

## 2022-05-18 ENCOUNTER — TELEPHONE (OUTPATIENT)
Dept: GASTROENTEROLOGY | Facility: CLINIC | Age: 72
End: 2022-05-18

## 2022-05-25 ENCOUNTER — TELEPHONE (OUTPATIENT)
Dept: GASTROENTEROLOGY | Facility: CLINIC | Age: 72
End: 2022-05-25

## 2022-05-25 NOTE — RESULT ENCOUNTER NOTE
I spoke with patient    Biopsy negative for celiac disease no EGD recall proceed with colonoscopy as scheduled

## 2022-05-25 NOTE — TELEPHONE ENCOUNTER
Dr Shaunna Diehl suggested you be asked what prep to use for this pt  Pt had a poor prep last attempted colon 5/12/22-pt used clenpiq  Two day prep is being suggested-ok to do a extended clenpiq prep? Pt cannot tolerate the colyte

## 2022-06-07 NOTE — TELEPHONE ENCOUNTER
Agree with 2 day prep and have patient take Miralax  --5 days prior to procedure  daily too - thanks

## 2022-06-08 ENCOUNTER — TELEPHONE (OUTPATIENT)
Dept: GASTROENTEROLOGY | Facility: CLINIC | Age: 72
End: 2022-06-08

## 2022-06-08 NOTE — TELEPHONE ENCOUNTER
Scheduled date of colonoscopy (as of today): 6/15/22  Physician performing colonoscopy: Dr Elda Hernandez  Location of colonoscopy: [facility]  Bowel prep reviewed with patient: split dose extended clenpiq   Instructions reviewed with patient by: Leidy Lizama  Clearances: no

## 2022-06-15 ENCOUNTER — HOSPITAL ENCOUNTER (OUTPATIENT)
Dept: GASTROENTEROLOGY | Facility: HOSPITAL | Age: 72
Setting detail: OUTPATIENT SURGERY
Discharge: HOME | End: 2022-06-15
Attending: INTERNAL MEDICINE | Admitting: INTERNAL MEDICINE
Payer: MEDICARE

## 2022-06-15 ENCOUNTER — ANESTHESIA EVENT (OUTPATIENT)
Dept: GASTROENTEROLOGY | Facility: HOSPITAL | Age: 72
End: 2022-06-15

## 2022-06-15 ENCOUNTER — ANESTHESIA (OUTPATIENT)
Dept: GASTROENTEROLOGY | Facility: HOSPITAL | Age: 72
End: 2022-06-15

## 2022-06-15 VITALS
BODY MASS INDEX: 29.14 KG/M2 | HEIGHT: 61 IN | TEMPERATURE: 97.4 F | SYSTOLIC BLOOD PRESSURE: 100 MMHG | WEIGHT: 154.32 LBS | DIASTOLIC BLOOD PRESSURE: 50 MMHG | OXYGEN SATURATION: 99 % | RESPIRATION RATE: 22 BRPM | HEART RATE: 65 BPM

## 2022-06-15 DIAGNOSIS — Z53.9 PROCEDURE NOT CARRIED OUT: ICD-10-CM

## 2022-06-15 DIAGNOSIS — R63.4 UNINTENTIONAL WEIGHT LOSS: ICD-10-CM

## 2022-06-15 DIAGNOSIS — D64.9 ANEMIA, UNSPECIFIED TYPE: ICD-10-CM

## 2022-06-15 PROCEDURE — 45380 COLONOSCOPY AND BIOPSY: CPT | Performed by: INTERNAL MEDICINE

## 2022-06-15 PROCEDURE — 88305 TISSUE EXAM BY PATHOLOGIST: CPT | Performed by: PATHOLOGY

## 2022-06-15 RX ORDER — SODIUM CHLORIDE, SODIUM LACTATE, POTASSIUM CHLORIDE, CALCIUM CHLORIDE 600; 310; 30; 20 MG/100ML; MG/100ML; MG/100ML; MG/100ML
INJECTION, SOLUTION INTRAVENOUS CONTINUOUS PRN
Status: DISCONTINUED | OUTPATIENT
Start: 2022-06-15 | End: 2022-06-15

## 2022-06-15 RX ORDER — PROPOFOL 10 MG/ML
INJECTION, EMULSION INTRAVENOUS AS NEEDED
Status: DISCONTINUED | OUTPATIENT
Start: 2022-06-15 | End: 2022-06-15

## 2022-06-15 RX ADMIN — PROPOFOL 50 MG: 10 INJECTION, EMULSION INTRAVENOUS at 08:13

## 2022-06-15 RX ADMIN — SODIUM CHLORIDE, SODIUM LACTATE, POTASSIUM CHLORIDE, AND CALCIUM CHLORIDE: .6; .31; .03; .02 INJECTION, SOLUTION INTRAVENOUS at 08:26

## 2022-06-15 RX ADMIN — PROPOFOL 50 MG: 10 INJECTION, EMULSION INTRAVENOUS at 08:24

## 2022-06-15 RX ADMIN — PROPOFOL 50 MG: 10 INJECTION, EMULSION INTRAVENOUS at 08:19

## 2022-06-15 RX ADMIN — PROPOFOL 50 MG: 10 INJECTION, EMULSION INTRAVENOUS at 08:06

## 2022-06-15 RX ADMIN — PROPOFOL 120 MG: 10 INJECTION, EMULSION INTRAVENOUS at 08:03

## 2022-06-15 RX ADMIN — SODIUM CHLORIDE, SODIUM LACTATE, POTASSIUM CHLORIDE, AND CALCIUM CHLORIDE: .6; .31; .03; .02 INJECTION, SOLUTION INTRAVENOUS at 07:50

## 2022-06-15 RX ADMIN — PROPOFOL 50 MG: 10 INJECTION, EMULSION INTRAVENOUS at 08:09

## 2022-06-15 RX ADMIN — PROPOFOL 50 MG: 10 INJECTION, EMULSION INTRAVENOUS at 08:16

## 2022-06-15 NOTE — ANESTHESIA POSTPROCEDURE EVALUATION
Post-Op Assessment Note    CV Status:  Stable  Pain Score: 0    Pain management: adequate     Mental Status:  Sleepy   Hydration Status:  Stable   PONV Controlled:  None   Airway Patency:  Patent      Post Op Vitals Reviewed: Yes      Staff: CRNA         No complications documented      /74 (06/15/22 0834)    Temp 97 6 °F (36 4 °C) (06/15/22 0834)    Pulse 72 (06/15/22 0834)   Resp 20 (06/15/22 0834)    SpO2 98 % (06/15/22 0834)

## 2022-06-15 NOTE — ANESTHESIA PREPROCEDURE EVALUATION
Procedure:  COLONOSCOPY    Relevant Problems   CARDIO   (+) Hypertension      ENDO   (+) Hyperparathyroidism (HCC)      NEURO/PSYCH   (+) Anxiety   (+) History of cervical dysplasia        Physical Exam    Airway    Mallampati score: I  TM Distance: >3 FB  Neck ROM: full     Dental   No notable dental hx     Cardiovascular  Rhythm: regular, Rate: normal,     Pulmonary  Breath sounds clear to auscultation,     Other Findings  Intercisor Distance > 3cm          Anesthesia Plan  ASA Score- 2     Anesthesia Type- IV sedation with anesthesia with ASA Monitors  Additional Monitors:   Airway Plan:     Comment: NPO appropriate  Discussed benefits/risks of monitored anesthetic care and discussed providing a dynamic level of mild to deep sedation  Complications include awareness, airway obstruction, aspiration which may necessitate conversion to general anesthesia  All questions answered  Patient understands and wishes to proceed          Plan Factors-Exercise tolerance (METS): >4 METS  Chart reviewed  EKG reviewed  Existing labs reviewed  Induction-     Postoperative Plan- Plan for postoperative opioid use  Informed Consent- Anesthetic plan and risks discussed with patient  I personally reviewed this patient with the CRNA  Discussed and agreed on the Anesthesia Plan with the CRNA  Lonnie Joaquin

## 2022-06-15 NOTE — H&P
History and Physical -  [facility]  Tiana Elizabeth 73 y o  female MRN: [id number]    HPI: Tiana Elizabeth is a 73y o  year old female who presents for colonoscopy  He has a history of recent anemia and weight loss    REVIEW OF SYSTEMS: Per the HPI, and otherwise unremarkable      Historical Information   Past Medical History:   Diagnosis Date    Anxiety     Condyloma     Depression     Eczema     HPV in female     [facility]roidism ([facility] [address] [facility]     Hypertension     Melanoma ([facility] [address] [facility]     Migraines     Papanicolaou smear 2018    Thyroid cancer [facility])      Past Surgical History:   Procedure Laterality Date    ABDOMINOPLASTY  1985    BARIATRIC SURGERY      CERVICAL CONE BIOPSY       SECTION   &     COLONOSCOPY      DILATION AND EVACUATION  1986    DXA PROCEDURE (HISTORICAL)      HERNIA REPAIR      MAMMO (HISTORICAL)      [facility]    OH HYSTEROSCOPY,W/ENDO BX N/A 3/1/2022    Procedure: HYSTEROSCOPY WITH POLYPECTOMY;  Surgeon: Shahrzad Burroughs MD;  Location:  MAIN OR;  Service: Gynecology    SINUS SURGERY      THYROIDECTOMY, PARTIAL      TONSILLECTOMY      TUBAL LIGATION       Social History   Social History     Substance and Sexual Activity   Alcohol Use Never    Comment: Does not drink alcohol - As per [employer]      Social History     Substance and Sexual Activity   Drug Use Never    Comment: No - As per [employer]      Social History     Tobacco Use   Smoking Status Never Smoker   Smokeless Tobacco Never Used     Family History   Problem Relation Age of Onset    Diabetes Mother     Hypertension Mother     Hypertension Father     Diabetes Father     Diabetes Maternal Grandmother     Heart disease Maternal Grandfather     No Known Problems Paternal Grandmother     No Known Problems Paternal Grandfather     Asthma Son     Raynaud syndrome Son     Breast cancer Neg Hx     Ovarian cancer Neg Hx     Colon cancer Neg Hx     Uterine cancer Neg Hx        Meds/Allergies       Current Outpatient Medications:     calcitriol (ROCALTROL) 0 5 MCG capsule    cyclobenzaprine (FLEXERIL) 10 mg tablet    diphenhydrAMINE (BENADRYL) 25 mg capsule    FLUoxetine (PROzac) 40 MG capsule    lamoTRIgine (LaMICtal) 150 MG tablet    levothyroxine 150 mcg tablet    LORazepam (ATIVAN) 1 mg tablet    metoprolol tartrate (LOPRESSOR) 25 mg tablet    Multiple Vitamins-Minerals (CENTRUM SILVER 50+WOMEN PO)    oxyCODONE (ROXICODONE) 10 MG TABS    Sod Picosulfate-Mag Ox-Cit Acd (Clenpiq) 10-3 5-12 MG-GM -GM/160ML SOLN    spironolactone (ALDACTONE) 50 mg tablet    Zinc 100 MG TABS    albuterol (PROVENTIL HFA,VENTOLIN HFA) 90 mcg/act inhaler    ergocalciferol (VITAMIN D2) 50,000 units    famotidine (PEPCID) 40 MG tablet    furosemide (LASIX) 40 mg tablet    losartan (COZAAR) 25 mg tablet    meclizine (ANTIVERT) 25 mg tablet    misoprostol (CYTOTEC) 100 mcg tablet    rosuvastatin (CRESTOR) 5 mg tablet    Allergies   Allergen Reactions    Banana Extract Allergy Skin Test - Food Allergy Shortness Of Breath     Chest tightness    Charentais Melon (Ukrainian Melon) Shortness Of Breath     Cantelope  Chest tightness    Ibuprofen Hives, Itching and Swelling    Kiwi Extract - Food Allergy Shortness Of Breath     Chest tightness    Iodinated Diagnostic Agents Hives    Naproxen Hives    Aspirin Other (See Comments)    Nsaids Other (See Comments)     swollen lips, oral itching    Pneumococcal Vaccine Other (See Comments)    Sulfamethoxazole-Trimethoprim Blisters and Other (See Comments)     oral ulcers      Trimethoprim Other (See Comments)    Ciprofloxacin Rash    Latex Rash and Swelling       Objective     /56   Pulse 70   Temp 98 2 °F (36 8 °C) (Temporal)   Resp 16   Ht 5' 1" (1 549 m)   Wt 70 kg (154 lb 5 2 oz)   SpO2 97%   BMI 29 16 kg/m²     PHYSICAL EXAM    Gen: NAD AAOx3  Head: Normocephalic, Atraumatic  CV: S1S2 RRR no m/r/g  CHEST: Clear b/l no c/r/w  ABD: soft, +BS NT/ND  EXT: no edema    ASSESSMENT/PLAN:  This is a 73y o  year old female here for diagnostic colonoscopy, and she is stable and optimized for her procedure

## 2024-03-13 ENCOUNTER — HOSPITAL ENCOUNTER (OUTPATIENT)
Dept: HOSPITAL 99 - DHCBC MAIN | Age: 74
End: 2024-03-13
Payer: MEDICARE

## 2024-03-13 DIAGNOSIS — I35.1: ICD-10-CM

## 2024-03-13 DIAGNOSIS — I10: Primary | ICD-10-CM

## 2024-03-13 DIAGNOSIS — I44.4: ICD-10-CM

## 2024-03-13 DIAGNOSIS — I89.0: ICD-10-CM

## 2024-03-13 DIAGNOSIS — I47.29: ICD-10-CM

## 2024-05-09 ENCOUNTER — HOSPITAL ENCOUNTER (OUTPATIENT)
Dept: HOSPITAL 99 - RCS | Age: 74
End: 2024-05-09
Payer: MEDICARE

## 2024-05-09 DIAGNOSIS — I10: Primary | ICD-10-CM

## 2024-05-09 DIAGNOSIS — I47.29: ICD-10-CM

## 2025-02-20 ENCOUNTER — OFFICE VISIT (OUTPATIENT)
Dept: GASTROENTEROLOGY | Facility: CLINIC | Age: 75
End: 2025-02-20
Payer: MEDICARE

## 2025-02-20 VITALS
BODY MASS INDEX: 29.27 KG/M2 | SYSTOLIC BLOOD PRESSURE: 130 MMHG | DIASTOLIC BLOOD PRESSURE: 80 MMHG | WEIGHT: 155 LBS | HEIGHT: 61 IN

## 2025-02-20 DIAGNOSIS — R93.5 ABNORMAL CT OF THE ABDOMEN: ICD-10-CM

## 2025-02-20 DIAGNOSIS — Z98.84 HISTORY OF GASTRIC BYPASS: ICD-10-CM

## 2025-02-20 DIAGNOSIS — K59.09 CHRONIC CONSTIPATION: Primary | ICD-10-CM

## 2025-02-20 PROCEDURE — G2211 COMPLEX E/M VISIT ADD ON: HCPCS | Performed by: INTERNAL MEDICINE

## 2025-02-20 PROCEDURE — 99214 OFFICE O/P EST MOD 30 MIN: CPT | Performed by: INTERNAL MEDICINE

## 2025-02-20 RX ORDER — POLYETHYLENE GLYCOL 3350 17 G/17G
17 POWDER, FOR SOLUTION ORAL DAILY
Qty: 255 G | Refills: 1 | Status: SHIPPED | OUTPATIENT
Start: 2025-02-20

## 2025-02-20 NOTE — PROGRESS NOTES
"Name: Tamy Lyon      : 1950      MRN: [id number]  Encounter Provider: Gustavo Smart DO  Encounter Date: 2025   Encounter department: [facility]  :  Assessment & Plan  Chronic constipation  Knowing that she took a full bowel prep for colonoscopy and still had solid stool, by definition she is severely constipated, I think this is the likely etiology of her pain.  I started MiraLAX daily, and she will update me in about a month to see how she is doing  Orders:    polyethylene glycol (GLYCOLAX) 17 GM/SCOOP powder; Take 17 g by mouth daily    History of gastric bypass  Apparently subsequent to her bypass surgery, she had lots of surgical adhesions that were lysed.  Knowing that she had these and likely has developed more, is likely contributing to her constipation       Abnormal CT of the abdomen  Alarmingly, her CAT scan mentions a hysterectomy that she never had, so not sure the report is actually of her scan.  I did log into the [employer] system and looked at the images myself.  She has an unbelievable amount of stool throughout her colon consistent with her constipation picture as above         History of Present Illness   HPI  Tamy Lyon is a 74 y.o. female who presents in follow-up due to continued issues with abdominal pain.  She intermittently gets severe abdominal pain-so much so that she can eat for a few days.  She also has left upper quadrant constant discomfort.  She does not move her bowels daily and does not feel evacuated when she goes.  She has had a history of poor prep and also surgical adhesions.  She had a CAT scan done at [facility] because of the pain and was ordered by her PCP.      Review of Systems       Objective   /80 (BP Location: Right arm, Patient Position: Sitting)   Ht 5' 1\" (1.549 m)   Wt 70.3 kg (155 lb)   BMI 29.29 kg/m²      Physical Exam  General Appearance: NAD, cooperative, alert  Eyes: " Anicteric  GI:  Soft, non-tender, non-distended; normal bowel sounds; no masses, no organomegaly   Rectal: Deferred  Musculoskeletal: No edema.  Skin:  No jaundice

## 2025-02-25 ENCOUNTER — TELEPHONE (OUTPATIENT)
Dept: GASTROENTEROLOGY | Facility: CLINIC | Age: 75
End: 2025-02-25

## 2025-02-25 NOTE — TELEPHONE ENCOUNTER
LOV 02/20/25    Pt is inquiring if  has further details to discuss regarding her CT completed at . Office notes reviewed with pt. Informed pt I would forward her question to .

## 2025-03-31 ENCOUNTER — TELEPHONE (OUTPATIENT)
Age: 75
End: 2025-03-31

## 2025-03-31 NOTE — TELEPHONE ENCOUNTER
Patients GI provider:  Dr. Smart    Number to return call: [phone]    Reason for call: Pt calling requesting referral for glycolax to last until her appointment.    Scheduled procedure/appointment date if applicable: Apt 5/6/2025

## 2025-04-01 DIAGNOSIS — K59.09 CHRONIC CONSTIPATION: ICD-10-CM

## 2025-04-01 RX ORDER — POLYETHYLENE GLYCOL 3350 17 G/17G
17 POWDER, FOR SOLUTION ORAL DAILY
Qty: 255 G | Refills: 1 | Status: SHIPPED | OUTPATIENT
Start: 2025-04-01

## 2025-06-04 ENCOUNTER — OFFICE VISIT (OUTPATIENT)
Dept: GASTROENTEROLOGY | Facility: CLINIC | Age: 75
End: 2025-06-04
Payer: MEDICARE

## 2025-06-04 VITALS
BODY MASS INDEX: 28.89 KG/M2 | WEIGHT: 153 LBS | DIASTOLIC BLOOD PRESSURE: 70 MMHG | SYSTOLIC BLOOD PRESSURE: 118 MMHG | HEIGHT: 61 IN

## 2025-06-04 DIAGNOSIS — E66.01 MORBID OBESITY (HCC): ICD-10-CM

## 2025-06-04 DIAGNOSIS — Z98.84 HISTORY OF GASTRIC BYPASS: ICD-10-CM

## 2025-06-04 DIAGNOSIS — K59.09 CHRONIC CONSTIPATION: Primary | ICD-10-CM

## 2025-06-04 PROCEDURE — G2211 COMPLEX E/M VISIT ADD ON: HCPCS | Performed by: INTERNAL MEDICINE

## 2025-06-04 PROCEDURE — 99214 OFFICE O/P EST MOD 30 MIN: CPT | Performed by: INTERNAL MEDICINE

## 2025-06-04 NOTE — PROGRESS NOTES
"Name: Tamy Lyon      : 1950      MRN: [id number]  Encounter Provider: Gustavo Smart DO  Encounter Date: 2025   Encounter department: [facility]  :  Assessment & Plan  Chronic constipation  She'd prefer to attempt to clean out with a colonoscopy prep and increase MiraLAX to BID, which we will proceed with (rather than trying a prescription laxative such as Linzess). She will update me in a couple of weeks  Orders:    polyethylene glycol (GOLYTELY) 4000 mL solution; Take 4,000 mL by mouth once for 1 dose    History of gastric bypass  Doing well and taking her vitamins and supplements in the setting of malabsorption       Morbid obesity (HCC)  She actually does not meet this criteria and is status post bypass           History of Present Illness   HPI  Tamy Lyon is a 74 y.o. female who presents in follow up with continued issues with constipation. She is having issues with gas and incomplete bowel movements. She will not go for a day and then need multiple bowel movements to get evacuated for 24-48 hours. She denies blood and mucus in the stool. She has been taking MiraLAX daily.      Review of Systems       Objective   /70   Ht 5' 1\" (1.549 m)   Wt 69.4 kg (153 lb)   BMI 28.91 kg/m²      Physical Exam  General Appearance: NAD, cooperative, alert  Eyes: Anicteric  GI:  Soft, non-tender, non-distended; normal bowel sounds; no masses, no organomegaly   Rectal: Deferred  Musculoskeletal: No edema.  Skin:  No jaundice      "

## 2025-06-27 DIAGNOSIS — K59.09 CHRONIC CONSTIPATION: ICD-10-CM

## 2025-06-27 RX ORDER — POLYETHYLENE GLYCOL 3350 17 G/17G
17 POWDER, FOR SOLUTION ORAL DAILY
Qty: 850 G | Refills: 1 | Status: SHIPPED | OUTPATIENT
Start: 2025-06-27

## 2025-06-27 NOTE — TELEPHONE ENCOUNTER
Reason for call:   [x] Refill   [] Prior Auth  [x] Other: patient requests a large size     Office:   [] PCP/Provider -   [x] Specialty/Provider - Gustavo Smart, / gastro bux     Medication: polyethylene glycol (GLYCOLAX) 17 GM/SCOOP powder     Dose/Frequency: : Take 17 g by mouth daily,     Quantity: 850    Pharmacy: [address]   Does the patient have enough for 3 days?   [] Yes   [x] No - Send as HP to POD

## 2025-08-05 ENCOUNTER — TELEPHONE (OUTPATIENT)
Age: 75
End: 2025-08-05

## 2025-08-05 DIAGNOSIS — F41.1 GAD (GENERALIZED ANXIETY DISORDER): ICD-10-CM

## 2025-08-05 DIAGNOSIS — R60.9 EDEMA, UNSPECIFIED TYPE: Primary | ICD-10-CM

## 2025-08-05 RX ORDER — SPIRONOLACTONE 50 MG/1
50 TABLET, FILM COATED ORAL DAILY
Qty: 90 TABLET | Refills: 1 | Status: SHIPPED | OUTPATIENT
Start: 2025-08-05 | End: 2025-08-14

## 2025-08-05 RX ORDER — LORAZEPAM 1 MG/1
1 TABLET ORAL
Qty: 30 TABLET | Refills: 0 | Status: SHIPPED | OUTPATIENT
Start: 2025-08-05

## 2025-08-17 ENCOUNTER — APPOINTMENT (OUTPATIENT)
Dept: RADIOLOGY | Facility: CLINIC | Age: 75
End: 2025-08-17
Attending: FAMILY MEDICINE
Payer: MEDICARE

## 2025-08-17 ENCOUNTER — OFFICE VISIT (OUTPATIENT)
Dept: URGENT CARE | Facility: CLINIC | Age: 75
End: 2025-08-17
Payer: MEDICARE

## 2025-08-17 VITALS
TEMPERATURE: 98.5 F | RESPIRATION RATE: 18 BRPM | DIASTOLIC BLOOD PRESSURE: 68 MMHG | OXYGEN SATURATION: 94 % | SYSTOLIC BLOOD PRESSURE: 120 MMHG | HEART RATE: 80 BPM

## 2025-08-17 DIAGNOSIS — R06.02 SOB (SHORTNESS OF BREATH): ICD-10-CM

## 2025-08-17 DIAGNOSIS — R05.8 PRODUCTIVE COUGH: ICD-10-CM

## 2025-08-17 DIAGNOSIS — J01.10 ACUTE NON-RECURRENT FRONTAL SINUSITIS: Primary | ICD-10-CM

## 2025-08-17 PROCEDURE — G0463 HOSPITAL OUTPT CLINIC VISIT: HCPCS | Performed by: FAMILY MEDICINE

## 2025-08-17 PROCEDURE — 99203 OFFICE O/P NEW LOW 30 MIN: CPT | Performed by: FAMILY MEDICINE

## 2025-08-17 PROCEDURE — 71046 X-RAY EXAM CHEST 2 VIEWS: CPT

## 2025-08-17 RX ORDER — METHYLPREDNISOLONE 4 MG/1
TABLET ORAL
Qty: 21 TABLET | Refills: 0 | Status: SHIPPED | OUTPATIENT
Start: 2025-08-17

## 2025-08-17 RX ORDER — AZITHROMYCIN 250 MG/1
TABLET, FILM COATED ORAL
Qty: 6 TABLET | Refills: 0 | Status: SHIPPED | OUTPATIENT
Start: 2025-08-17 | End: 2025-08-21

## 2025-08-17 RX ORDER — ALBUTEROL SULFATE 90 UG/1
2 INHALANT RESPIRATORY (INHALATION) EVERY 4 HOURS PRN
Qty: 18 G | Refills: 0 | Status: SHIPPED | OUTPATIENT
Start: 2025-08-17

## 2025-08-20 DIAGNOSIS — R60.9 EDEMA, UNSPECIFIED TYPE: ICD-10-CM

## 2025-08-20 RX ORDER — SPIRONOLACTONE 25 MG/1
25 TABLET ORAL DAILY
Qty: 90 TABLET | Refills: 3 | Status: SHIPPED | OUTPATIENT
Start: 2025-08-20

## (undated) DEVICE — ARTHROSCOPY FLOOR MAT

## (undated) DEVICE — SPONGE STICK WITH PVP-I: Brand: KENDALL

## (undated) DEVICE — GLOVE INDICATOR PI UNDERGLOVE SZ 7.5 BLUE

## (undated) DEVICE — STRL ALLENTOWN HYSTEROSCOPY PK: Brand: CARDINAL HEALTH

## (undated) DEVICE — MAXI PAD5.51 X 13.78 IN. (14.0 X 35.0 CM)HEAVYCONTOUREDUNSCENTED: Brand: CURITY

## (undated) DEVICE — TISSUE REMOVAL SYSTEM RESECTING DEVICE: Brand: SYMPHION

## (undated) DEVICE — NEEDLE SPINAL 25G X 3.5 IN QUINCKE

## (undated) DEVICE — SYRINGE 10ML LL CONTROL TOP

## (undated) DEVICE — TISSUE REMOVAL SYSTEM FLUID MANAGEMENT ACCESSORIES: Brand: SYMPHION